# Patient Record
Sex: FEMALE | Race: WHITE | NOT HISPANIC OR LATINO | Employment: FULL TIME | URBAN - METROPOLITAN AREA
[De-identification: names, ages, dates, MRNs, and addresses within clinical notes are randomized per-mention and may not be internally consistent; named-entity substitution may affect disease eponyms.]

---

## 2019-10-14 ENCOUNTER — LAB REQUISITION (OUTPATIENT)
Dept: LAB | Facility: OTHER | Age: 24
End: 2019-10-14

## 2019-10-14 DIAGNOSIS — Z11.1 SCREENING-PULMONARY TB: ICD-10-CM

## 2019-10-14 PROCEDURE — 86481 TB AG RESPONSE T-CELL SUSP: CPT | Performed by: PHYSICAL MEDICINE & REHABILITATION

## 2019-10-16 LAB
TSPOT INTERPRETATION: NEGATIVE
TSPOT NIL CONTROL INTERPRETATION: NORMAL
TSPOT PANEL A: 1
TSPOT PANEL B: 0
TSPOT POS CONTROL INTERPRETATION: NORMAL

## 2020-10-19 ENCOUNTER — LAB REQUISITION (OUTPATIENT)
Dept: LAB | Facility: OTHER | Age: 25
End: 2020-10-19

## 2020-10-19 DIAGNOSIS — Z00.00 ANNUAL PHYSICAL EXAM: ICD-10-CM

## 2020-10-19 PROCEDURE — 86481 TB AG RESPONSE T-CELL SUSP: CPT | Performed by: PHYSICAL MEDICINE & REHABILITATION

## 2020-10-21 LAB
TSPOT INTERPRETATION: NEGATIVE
TSPOT NIL CONTROL INTERPRETATION: NORMAL
TSPOT PANEL A: 0
TSPOT PANEL B: 0
TSPOT POS CONTROL INTERPRETATION: NORMAL

## 2022-09-13 ENCOUNTER — OFFICE VISIT (OUTPATIENT)
Dept: OBSTETRICS AND GYNECOLOGY | Facility: CLINIC | Age: 27
End: 2022-09-13

## 2022-09-13 VITALS
HEART RATE: 95 BPM | WEIGHT: 161.6 LBS | HEIGHT: 64 IN | DIASTOLIC BLOOD PRESSURE: 81 MMHG | BODY MASS INDEX: 27.59 KG/M2 | SYSTOLIC BLOOD PRESSURE: 119 MMHG

## 2022-09-13 DIAGNOSIS — R10.32 LEFT LOWER QUADRANT PAIN: ICD-10-CM

## 2022-09-13 DIAGNOSIS — N93.9 ABNORMAL UTERINE BLEEDING (AUB): Primary | ICD-10-CM

## 2022-09-13 PROCEDURE — 99203 OFFICE O/P NEW LOW 30 MIN: CPT | Performed by: OBSTETRICS & GYNECOLOGY

## 2022-09-13 RX ORDER — MEDROXYPROGESTERONE ACETATE 10 MG/1
10 TABLET ORAL DAILY
Qty: 15 TABLET | Refills: 0 | Status: SHIPPED | OUTPATIENT
Start: 2022-09-13 | End: 2022-09-28

## 2022-09-13 NOTE — PROGRESS NOTES
SUBJECTIVE:   Chief Complaint   Patient presents with   • Vaginal Bleeding     Pt presents today for vaginal bleeding since  and infertility.         Iain Parkinson is a 26 y.o.  who presents for vaginal bleeding and pelvic pain.  Here with her  who she prefers to use as an interpretor.  Reports that she started having pain in  on the right side, had endometriosis surgery in  and that pain improved. She now has left sided, lower quadrant pain. Denies any alleviating or exacerbating factors aside from some improvement with ibuprofen.    Started bleeding irregulary in , and tried oral contraceptive pill on separate courses which helped the bleeding but did not stop it.    In the last 3 months, she had some bleeding that stopped for about 3 months (from May), started again around 22.  She has been bleeding daily since then.  Until last week it was not heavy; in the last week, she has had heavier bleeding with clots and changing her pad 4-5 times per day.  She has been trying to conceive since May 2022.    She conceived within 30 days for her last pregnancy.     Denies h/o transfusion; denies h/o easy bruising or bleeding.  Last CT scan was prior to her last surgery  Last US was in May 2022, showed 3 cm simple cyst of the right ovary.       Past Medical History:   Diagnosis Date   • Endometriosis      Past Surgical History:   Procedure Laterality Date   •  SECTION     • MINI-LAPAROTOMY  2020    subcutaneous endometrioma     OB History    Para Term  AB Living   1 1   1   1   SAB IAB Ectopic Molar Multiple Live Births             1      # Outcome Date GA Lbr Guru/2nd Weight Sex Delivery Anes PTL Lv   1  17 26w2d  754 g (1 lb 10.6 oz) F CS-Classical Gen Y BERNIE      Social History     Tobacco Use   • Smoking status: Never Smoker   • Smokeless tobacco: Never Used   Vaping Use   • Vaping Use: Never used   Substance Use Topics   • Alcohol use: Never  "  • Drug use: Never     Family History   Problem Relation Age of Onset   • Diabetes Father    • Thyroid disease Mother    • Thyroid disease Sister      No current outpatient medications on file prior to visit.     No current facility-administered medications on file prior to visit.     No Known Allergies     Review of Systems      OBJECTIVE:   Vitals:    09/13/22 0842   BP: 119/81   Pulse: 95   Weight: 73.3 kg (161 lb 9.6 oz)   Height: 162.6 cm (64\")      Physical Exam  Constitutional:       General: She is not in acute distress.     Appearance: She is well-developed. She is not diaphoretic.   HENT:      Head: Normocephalic and atraumatic.   Eyes:      General: No scleral icterus.     Extraocular Movements: Extraocular movements intact.   Pulmonary:      Effort: Pulmonary effort is normal. No respiratory distress.   Abdominal:      General: Abdomen is flat. A surgical scar is present.      Palpations: Abdomen is soft.      Tenderness: There is no guarding or rebound. Negative signs include McBurney's sign.      Hernia: No hernia is present.   Skin:     General: Skin is warm and dry.   Neurological:      General: No focal deficit present.      Mental Status: She is alert and oriented to person, place, and time.   Psychiatric:         Mood and Affect: Mood normal.         Behavior: Behavior normal.         Thought Content: Thought content normal.         Judgment: Judgment normal.         ASSESSMENT/PLAN:     ICD-10-CM ICD-9-CM   1. Abnormal uterine bleeding (AUB)  N93.9 626.9   2. Left lower quadrant pain  R10.32 789.04       Will get labs per below. Does have family h/o thyroid disease as well as diabetes. Has been tested for these in the past and were normal  Recommend trial of Provera to get this course of bleeding to stop. Unable to provide specimen for UPT, hcg added to labs  Reviewed differential for pain and work up.  We reviewed use of SIS/HSG to evaluate cavity of uterus and she agrees to SIS. She is aware " of the transvaginal and saline infusion portions of the procedure.  She will ideally do this when she has stopped bleeding.  Reviewed use of CT imaging versus laparoscopy to better evaluate pelvic anatomy, consider referral to a provider with ability to laser in case implants discovered.  She will consider this option.   For LLQ pain, since Ibuprofen has helped in isolated doses, recommend trial of scheduled NSAID x 3 days to see if this improves pain.      Orders Placed This Encounter   Procedures   • CBC (No Diff)     Order Specific Question:   Release to patient     Answer:   Routine Release   • TSH Rfx On Abnormal To Free T4     Order Specific Question:   Release to patient     Answer:   Routine Release   • Prolactin     Order Specific Question:   Release to patient     Answer:   Routine Release   • Von Willebrand Factor Activity     Order Specific Question:   Release to patient     Answer:   Routine Release   • Testosterone   • Follicle Stimulating Hormone     Order Specific Question:   Release to patient     Answer:   Routine Release   • Estradiol   • Hemoglobin A1c     Order Specific Question:   Release to patient     Answer:   Routine Release       Return in about 1 week (around 9/20/2022) for saline infusion sonogram at [place].

## 2022-09-14 LAB
ERYTHROCYTE [DISTWIDTH] IN BLOOD BY AUTOMATED COUNT: 13.5 % (ref 12.3–15.4)
ESTRADIOL SERPL-MCNC: 56.5 PG/ML
FSH SERPL-ACNC: 7 MIU/ML
HBA1C MFR BLD: 5.4 % (ref 4.8–5.6)
HCT VFR BLD AUTO: 41.8 % (ref 34–46.6)
HGB BLD-MCNC: 13.7 G/DL (ref 12–15.9)
MCH RBC QN AUTO: 28.1 PG (ref 26.6–33)
MCHC RBC AUTO-ENTMCNC: 32.8 G/DL (ref 31.5–35.7)
MCV RBC AUTO: 85.8 FL (ref 79–97)
PLATELET # BLD AUTO: 342 10*3/MM3 (ref 140–450)
PROLACTIN SERPL-MCNC: 10.9 NG/ML (ref 4.8–23.3)
RBC # BLD AUTO: 4.87 10*6/MM3 (ref 3.77–5.28)
TESTOST SERPL-MCNC: 73 NG/DL (ref 13–71)
TSH SERPL DL<=0.005 MIU/L-ACNC: 2.63 UIU/ML (ref 0.27–4.2)
WBC # BLD AUTO: 8.78 10*3/MM3 (ref 3.4–10.8)

## 2022-09-15 ENCOUNTER — PATIENT ROUNDING (BHMG ONLY) (OUTPATIENT)
Dept: OBSTETRICS AND GYNECOLOGY | Facility: CLINIC | Age: 27
End: 2022-09-15

## 2022-09-15 ENCOUNTER — PATIENT MESSAGE (OUTPATIENT)
Dept: OBSTETRICS AND GYNECOLOGY | Facility: CLINIC | Age: 27
End: 2022-09-15

## 2022-09-15 LAB — VWF:RCO ACT/NOR PPP PL AGG: 79 % (ref 50–200)

## 2022-09-15 NOTE — PROGRESS NOTES
My chart message has been sent to the patient for PATIENT ROUNDING with [facility].

## 2022-09-26 ENCOUNTER — OFFICE VISIT (OUTPATIENT)
Dept: OBSTETRICS AND GYNECOLOGY | Facility: CLINIC | Age: 27
End: 2022-09-26

## 2022-09-26 VITALS
HEIGHT: 64 IN | HEART RATE: 93 BPM | WEIGHT: 162.6 LBS | BODY MASS INDEX: 27.76 KG/M2 | DIASTOLIC BLOOD PRESSURE: 75 MMHG | SYSTOLIC BLOOD PRESSURE: 114 MMHG

## 2022-09-26 DIAGNOSIS — N93.9 ABNORMAL UTERINE BLEEDING (AUB): Primary | ICD-10-CM

## 2022-09-26 DIAGNOSIS — R79.89 ELEVATED TESTOSTERONE LEVEL: ICD-10-CM

## 2022-09-26 LAB
B-HCG UR QL: NEGATIVE
EXPIRATION DATE: NORMAL
INTERNAL NEGATIVE CONTROL: NEGATIVE
INTERNAL POSITIVE CONTROL: POSITIVE
Lab: NORMAL

## 2022-09-26 PROCEDURE — 81025 URINE PREGNANCY TEST: CPT | Performed by: OBSTETRICS & GYNECOLOGY

## 2022-09-26 PROCEDURE — 99213 OFFICE O/P EST LOW 20 MIN: CPT | Performed by: OBSTETRICS & GYNECOLOGY

## 2022-09-26 PROCEDURE — 58340 CATHETER FOR HYSTEROGRAPHY: CPT | Performed by: OBSTETRICS & GYNECOLOGY

## 2022-09-26 RX ORDER — SODIUM CHLORIDE 0.9 % (FLUSH) 0.9 %
3 SYRINGE (ML) INJECTION EVERY 12 HOURS SCHEDULED
Status: CANCELLED | OUTPATIENT
Start: 2022-11-30

## 2022-09-26 RX ORDER — SODIUM CHLORIDE 0.9 % (FLUSH) 0.9 %
10 SYRINGE (ML) INJECTION AS NEEDED
Status: CANCELLED | OUTPATIENT
Start: 2022-11-30

## 2022-09-26 NOTE — PROGRESS NOTES
"SUBJECTIVE:   Chief Complaint   Patient presents with   • Follow-up     Pt presents today for SIS        Iain Parkinson is a 26 y.o.  who presents for AUB. Still having some bleeding, last period mid August    Past Medical History:   Diagnosis Date   • Endometriosis      Past Surgical History:   Procedure Laterality Date   •  SECTION     • MINI-LAPAROTOMY  2020    subcutaneous endometrioma     OB History    Para Term  AB Living   1 1   1   1   SAB IAB Ectopic Molar Multiple Live Births             1      # Outcome Date GA Lbr Guru/2nd Weight Sex Delivery Anes PTL Lv   1  17 26w2d  754 g (1 lb 10.6 oz) F CS-Classical Gen Y BERNIE      Social History     Tobacco Use   • Smoking status: Never Smoker   • Smokeless tobacco: Never Used   Vaping Use   • Vaping Use: Never used   Substance Use Topics   • Alcohol use: Never   • Drug use: Never     Family History   Problem Relation Age of Onset   • Diabetes Father    • Thyroid disease Mother    • Thyroid disease Sister      Current Outpatient Medications on File Prior to Visit   Medication Sig Dispense Refill   • medroxyPROGESTERone (Provera) 10 MG tablet Take 1 tablet by mouth Daily for 15 days. 15 tablet 0     No current facility-administered medications on file prior to visit.     No Known Allergies     Review of Systems      OBJECTIVE:   Vitals:    22 1303   BP: 114/75   Pulse: 93   Weight: 73.8 kg (162 lb 9.6 oz)   Height: 162.6 cm (64.02\")      Physical Exam  Exam conducted with a chaperone present.   Constitutional:       Appearance: She is well-developed.   HENT:      Head: Normocephalic and atraumatic.   Eyes:      General: No scleral icterus.  Cardiovascular:      Rate and Rhythm: Normal rate.   Pulmonary:      Effort: Pulmonary effort is normal. No respiratory distress.   Abdominal:      General: There is no distension.      Palpations: Abdomen is soft.      Tenderness: There is no abdominal tenderness. There is no " guarding.   Genitourinary:     Labia:         Right: No rash, tenderness or lesion.         Left: No rash, tenderness or lesion.       Vagina: No vaginal discharge, bleeding or lesions.      Cervix: No cervical motion tenderness or friability.      Uterus: Normal. Not enlarged and not tender.       Adnexa: Right adnexa normal.        Right: No mass or tenderness.          Left: No mass or tenderness.     Musculoskeletal:      Cervical back: Normal range of motion.   Skin:     General: Skin is warm and dry.   Neurological:      Mental Status: She is alert and oriented to person, place, and time.   Psychiatric:         Behavior: Behavior normal.         ASSESSMENT/PLAN:     ICD-10-CM ICD-9-CM   1. Abnormal uterine bleeding (AUB)  N93.9 626.9   2. Elevated testosterone level  R79.89 790.6       Options for treatment were discussed with patient including hysteroscopy, D&C, polypectomy.    After discussing risks and benefits of each alternative, patient would like to proceed with HYSTEROSCOPY, DILATION AND CURETTAGE, HYSTEROSCOPIC POLYPECTOMY  She is understanding that this procedure involves dilation of cervix and placement of camera through cervix into uterus for removal of polyp and the risks include but are not limited to risks of anesthesia, injury to surrounding structures, infection, bleeding, uterine perforation, inability to diagnose, need for other surgeries/procedures.    She was verbally consented for the procedure and had all questions answered to her apparent satisfaction upon completion of the discussion.      PCO- counseled pt on elevated testosterone, will repeat today. May need to consider OI after surgery.         Orders Placed This Encounter   Procedures   • Testosterone, Free, Total     Order Specific Question:   Release to patient     Answer:   Routine Release   • DHEA-Sulfate     Order Specific Question:   Release to patient     Answer:   Routine Release   • POC Pregnancy, Urine     Order Specific  Question:   Release to patient     Answer:   Routine Release       No follow-ups on file.

## 2022-09-26 NOTE — PROGRESS NOTES
Saline Infusion Sonogram    Pre-operative Diagnosis: AUB    Post-operative Diagnosis: Same    Counseling/indications:  Iain Parkinson is a 26 y.o.  who presented with AUB. Reports that she had a period in August; still spotting; still taking provera. She was counseled on options for management/work up and agreed to a saline infusion sonogram. She understood that the risks of this procedure include but are not limited to bleeding, pain, infection, injury to surrounding structures.  We reviewed timing of the procedure for the most accurate image and an appointment was scheduled for her.  Her  is here with her today who she prefers to use as an interpretor    Procedure Details    Urine pregnancy test was done and was NEGATIVE .  The risks (including infection, bleeding, pain, and uterine perforation) and benefits of the procedure were explained to the patient and Verbal informed consent was obtained.       A timeout procedure was performed.  The patient was placed in the dorsal lithotomy position.  Bimanual exam showed the uterus to be in the anteroflexed position.  An open-sided Graves' speculum inserted in the vagina, and the cervix prepped with povidone iodine.       A sharp tenaculum was applied to the anterior lip of the cervix for stabilization.   A pipelle was placed into the cervix and the intrauterine balloon was inflated.  The tenaculum and the speculum were then removed and the transvaginal probe was placed by the sonographer.  After survey of the uterus, the intrauterine balloon was deflated and saline was injected into the uterus with the noted findings on pathology.  The pipelle was then removed.  A general ultrasound survey was performed of the uterus and adnexa with findings as mentioned in ultrasound report.    Condition:  Stable    Complications:  None    Findings:  Bilateral PCO-appearing ovaries, fundal polyp noted with otherwise normal appearing cavity/endometrium, compared to US from  May 2022 cyst has resolved    Plan:  Patient tolerated the procedure well  The patient was advised to call for any fever or for prolonged or severe pain or bleeding. She was advised to use OTC analgesics as needed for mild to moderate pain. She was advised to avoid vaginal intercourse for 48 hours or until the bleeding has completely stopped.      Serina Moreno MD

## 2022-09-27 LAB — DHEA-S SERPL-MCNC: 251 UG/DL (ref 84.8–378)

## 2022-09-29 LAB
TESTOST FREE SERPL-MCNC: 2.4 PG/ML (ref 0–4.2)
TESTOST SERPL-MCNC: 57 NG/DL (ref 13–71)

## 2022-10-04 PROBLEM — N93.9 ABNORMAL UTERINE BLEEDING (AUB): Status: ACTIVE | Noted: 2022-10-04

## 2022-10-24 ENCOUNTER — TELEPHONE (OUTPATIENT)
Dept: OBSTETRICS AND GYNECOLOGY | Facility: CLINIC | Age: 27
End: 2022-10-24

## 2022-11-02 ENCOUNTER — PATIENT MESSAGE (OUTPATIENT)
Dept: OBSTETRICS AND GYNECOLOGY | Facility: CLINIC | Age: 27
End: 2022-11-02

## 2022-11-02 RX ORDER — MEDROXYPROGESTERONE ACETATE 10 MG/1
10 TABLET ORAL DAILY
Qty: 30 TABLET | Refills: 0 | Status: SHIPPED | OUTPATIENT
Start: 2022-11-02 | End: 2022-11-30 | Stop reason: HOSPADM

## 2022-11-28 ENCOUNTER — PRE-ADMISSION TESTING (OUTPATIENT)
Dept: PREADMISSION TESTING | Facility: HOSPITAL | Age: 27
End: 2022-11-28

## 2022-11-28 VITALS
RESPIRATION RATE: 16 BRPM | WEIGHT: 163.1 LBS | HEIGHT: 65 IN | HEART RATE: 98 BPM | DIASTOLIC BLOOD PRESSURE: 83 MMHG | SYSTOLIC BLOOD PRESSURE: 127 MMHG | BODY MASS INDEX: 27.17 KG/M2 | TEMPERATURE: 98.3 F | OXYGEN SATURATION: 99 %

## 2022-11-28 LAB — HCG SERPL QL: NEGATIVE

## 2022-11-28 PROCEDURE — 85025 COMPLETE CBC W/AUTO DIFF WBC: CPT | Performed by: OBSTETRICS & GYNECOLOGY

## 2022-11-28 PROCEDURE — 84703 CHORIONIC GONADOTROPIN ASSAY: CPT

## 2022-11-30 ENCOUNTER — HOSPITAL ENCOUNTER (OUTPATIENT)
Facility: HOSPITAL | Age: 27
Setting detail: HOSPITAL OUTPATIENT SURGERY
Discharge: HOME | End: 2022-11-30
Attending: OBSTETRICS & GYNECOLOGY | Admitting: OBSTETRICS & GYNECOLOGY

## 2022-11-30 ENCOUNTER — ANESTHESIA EVENT (OUTPATIENT)
Dept: PERIOP | Facility: HOSPITAL | Age: 27
End: 2022-11-30

## 2022-11-30 ENCOUNTER — ANESTHESIA (OUTPATIENT)
Dept: PERIOP | Facility: HOSPITAL | Age: 27
End: 2022-11-30

## 2022-11-30 VITALS
SYSTOLIC BLOOD PRESSURE: 107 MMHG | RESPIRATION RATE: 16 BRPM | TEMPERATURE: 98.6 F | OXYGEN SATURATION: 97 % | DIASTOLIC BLOOD PRESSURE: 59 MMHG | BODY MASS INDEX: 28 KG/M2 | HEIGHT: 64 IN | HEART RATE: 71 BPM

## 2022-11-30 DIAGNOSIS — N93.9 ABNORMAL UTERINE BLEEDING (AUB): ICD-10-CM

## 2022-11-30 PROBLEM — N84.0 ENDOMETRIAL POLYP: Status: ACTIVE | Noted: 2022-11-30

## 2022-11-30 PROCEDURE — 25010000002 HYDROMORPHONE PER 4 MG: Performed by: NURSE ANESTHETIST, CERTIFIED REGISTERED

## 2022-11-30 PROCEDURE — 25010000002 FENTANYL CITRATE (PF) 50 MCG/ML SOLUTION: Performed by: NURSE ANESTHETIST, CERTIFIED REGISTERED

## 2022-11-30 PROCEDURE — 25010000002 PROPOFOL 10 MG/ML EMULSION: Performed by: NURSE ANESTHETIST, CERTIFIED REGISTERED

## 2022-11-30 PROCEDURE — 58558 HYSTEROSCOPY BIOPSY: CPT | Performed by: OBSTETRICS & GYNECOLOGY

## 2022-11-30 PROCEDURE — C1782 MORCELLATOR: HCPCS | Performed by: OBSTETRICS & GYNECOLOGY

## 2022-11-30 PROCEDURE — 25010000002 DROPERIDOL PER 5 MG: Performed by: ANESTHESIOLOGY

## 2022-11-30 PROCEDURE — 25010000002 KETOROLAC TROMETHAMINE PER 15 MG: Performed by: NURSE ANESTHETIST, CERTIFIED REGISTERED

## 2022-11-30 PROCEDURE — 25010000002 DEXAMETHASONE SODIUM PHOSPHATE 20 MG/5ML SOLUTION: Performed by: NURSE ANESTHETIST, CERTIFIED REGISTERED

## 2022-11-30 PROCEDURE — 88305 TISSUE EXAM BY PATHOLOGIST: CPT | Performed by: OBSTETRICS & GYNECOLOGY

## 2022-11-30 PROCEDURE — 25010000002 ONDANSETRON PER 1 MG: Performed by: NURSE ANESTHETIST, CERTIFIED REGISTERED

## 2022-11-30 RX ORDER — HYDROMORPHONE HYDROCHLORIDE 1 MG/ML
0.5 INJECTION, SOLUTION INTRAMUSCULAR; INTRAVENOUS; SUBCUTANEOUS
Status: DISCONTINUED | OUTPATIENT
Start: 2022-11-30 | End: 2022-11-30 | Stop reason: HOSPADM

## 2022-11-30 RX ORDER — HYDROCODONE BITARTRATE AND ACETAMINOPHEN 7.5; 325 MG/1; MG/1
1 TABLET ORAL ONCE AS NEEDED
Status: DISCONTINUED | OUTPATIENT
Start: 2022-11-30 | End: 2022-11-30 | Stop reason: HOSPADM

## 2022-11-30 RX ORDER — LIDOCAINE HYDROCHLORIDE 20 MG/ML
INJECTION, SOLUTION EPIDURAL; INFILTRATION; INTRACAUDAL; PERINEURAL AS NEEDED
Status: DISCONTINUED | OUTPATIENT
Start: 2022-11-30 | End: 2022-11-30 | Stop reason: SURG

## 2022-11-30 RX ORDER — EPHEDRINE SULFATE 50 MG/ML
5 INJECTION, SOLUTION INTRAVENOUS ONCE AS NEEDED
Status: DISCONTINUED | OUTPATIENT
Start: 2022-11-30 | End: 2022-11-30 | Stop reason: HOSPADM

## 2022-11-30 RX ORDER — PROPOFOL 10 MG/ML
VIAL (ML) INTRAVENOUS AS NEEDED
Status: DISCONTINUED | OUTPATIENT
Start: 2022-11-30 | End: 2022-11-30 | Stop reason: SURG

## 2022-11-30 RX ORDER — ONDANSETRON 4 MG/1
4 TABLET, FILM COATED ORAL DAILY PRN
Qty: 30 TABLET | Refills: 1 | Status: SHIPPED | OUTPATIENT
Start: 2022-11-30 | End: 2022-11-30 | Stop reason: SDUPTHER

## 2022-11-30 RX ORDER — DEXAMETHASONE SODIUM PHOSPHATE 4 MG/ML
INJECTION, SOLUTION INTRA-ARTICULAR; INTRALESIONAL; INTRAMUSCULAR; INTRAVENOUS; SOFT TISSUE AS NEEDED
Status: DISCONTINUED | OUTPATIENT
Start: 2022-11-30 | End: 2022-11-30 | Stop reason: SURG

## 2022-11-30 RX ORDER — DIPHENHYDRAMINE HYDROCHLORIDE 50 MG/ML
12.5 INJECTION INTRAMUSCULAR; INTRAVENOUS
Status: DISCONTINUED | OUTPATIENT
Start: 2022-11-30 | End: 2022-11-30 | Stop reason: HOSPADM

## 2022-11-30 RX ORDER — FENTANYL CITRATE 50 UG/ML
INJECTION, SOLUTION INTRAMUSCULAR; INTRAVENOUS AS NEEDED
Status: DISCONTINUED | OUTPATIENT
Start: 2022-11-30 | End: 2022-11-30 | Stop reason: SURG

## 2022-11-30 RX ORDER — KETOROLAC TROMETHAMINE 30 MG/ML
INJECTION, SOLUTION INTRAMUSCULAR; INTRAVENOUS AS NEEDED
Status: DISCONTINUED | OUTPATIENT
Start: 2022-11-30 | End: 2022-11-30 | Stop reason: SURG

## 2022-11-30 RX ORDER — ONDANSETRON 2 MG/ML
4 INJECTION INTRAMUSCULAR; INTRAVENOUS ONCE AS NEEDED
Status: COMPLETED | OUTPATIENT
Start: 2022-11-30 | End: 2022-11-30

## 2022-11-30 RX ORDER — LABETALOL HYDROCHLORIDE 5 MG/ML
5 INJECTION, SOLUTION INTRAVENOUS
Status: DISCONTINUED | OUTPATIENT
Start: 2022-11-30 | End: 2022-11-30 | Stop reason: HOSPADM

## 2022-11-30 RX ORDER — OXYCODONE AND ACETAMINOPHEN 7.5; 325 MG/1; MG/1
1 TABLET ORAL EVERY 4 HOURS PRN
Status: DISCONTINUED | OUTPATIENT
Start: 2022-11-30 | End: 2022-11-30 | Stop reason: HOSPADM

## 2022-11-30 RX ORDER — SODIUM CHLORIDE 0.9 % (FLUSH) 0.9 %
10 SYRINGE (ML) INJECTION AS NEEDED
Status: DISCONTINUED | OUTPATIENT
Start: 2022-11-30 | End: 2022-11-30 | Stop reason: HOSPADM

## 2022-11-30 RX ORDER — HYDRALAZINE HYDROCHLORIDE 20 MG/ML
5 INJECTION INTRAMUSCULAR; INTRAVENOUS
Status: DISCONTINUED | OUTPATIENT
Start: 2022-11-30 | End: 2022-11-30 | Stop reason: HOSPADM

## 2022-11-30 RX ORDER — IBUPROFEN 600 MG/1
600 TABLET ORAL EVERY 6 HOURS PRN
Qty: 30 TABLET | Refills: 0 | Status: SHIPPED | OUTPATIENT
Start: 2022-11-30 | End: 2022-12-22

## 2022-11-30 RX ORDER — DROPERIDOL 2.5 MG/ML
0.62 INJECTION, SOLUTION INTRAMUSCULAR; INTRAVENOUS ONCE
Status: COMPLETED | OUTPATIENT
Start: 2022-11-30 | End: 2022-11-30

## 2022-11-30 RX ORDER — ONDANSETRON 4 MG/1
4 TABLET, FILM COATED ORAL DAILY PRN
Qty: 30 TABLET | Refills: 1 | Status: SHIPPED | OUTPATIENT
Start: 2022-11-30 | End: 2022-12-22

## 2022-11-30 RX ORDER — FLUMAZENIL 0.1 MG/ML
0.2 INJECTION INTRAVENOUS AS NEEDED
Status: DISCONTINUED | OUTPATIENT
Start: 2022-11-30 | End: 2022-11-30 | Stop reason: HOSPADM

## 2022-11-30 RX ORDER — NALOXONE HCL 0.4 MG/ML
0.2 VIAL (ML) INJECTION AS NEEDED
Status: DISCONTINUED | OUTPATIENT
Start: 2022-11-30 | End: 2022-11-30 | Stop reason: HOSPADM

## 2022-11-30 RX ORDER — FAMOTIDINE 10 MG/ML
20 INJECTION, SOLUTION INTRAVENOUS
Status: COMPLETED | OUTPATIENT
Start: 2022-11-30 | End: 2022-11-30

## 2022-11-30 RX ORDER — PROMETHAZINE HYDROCHLORIDE 25 MG/1
25 TABLET ORAL ONCE AS NEEDED
Status: DISCONTINUED | OUTPATIENT
Start: 2022-11-30 | End: 2022-11-30 | Stop reason: HOSPADM

## 2022-11-30 RX ORDER — DIPHENHYDRAMINE HCL 25 MG
25 CAPSULE ORAL
Status: DISCONTINUED | OUTPATIENT
Start: 2022-11-30 | End: 2022-11-30 | Stop reason: HOSPADM

## 2022-11-30 RX ORDER — SODIUM CHLORIDE 0.9 % (FLUSH) 0.9 %
10 SYRINGE (ML) INJECTION EVERY 12 HOURS SCHEDULED
Status: DISCONTINUED | OUTPATIENT
Start: 2022-11-30 | End: 2022-11-30 | Stop reason: HOSPADM

## 2022-11-30 RX ORDER — ONDANSETRON 2 MG/ML
INJECTION INTRAMUSCULAR; INTRAVENOUS AS NEEDED
Status: DISCONTINUED | OUTPATIENT
Start: 2022-11-30 | End: 2022-11-30 | Stop reason: SURG

## 2022-11-30 RX ORDER — SODIUM CHLORIDE 0.9 % (FLUSH) 0.9 %
3 SYRINGE (ML) INJECTION EVERY 12 HOURS SCHEDULED
Status: DISCONTINUED | OUTPATIENT
Start: 2022-11-30 | End: 2022-11-30 | Stop reason: HOSPADM

## 2022-11-30 RX ORDER — SODIUM CHLORIDE 9 MG/ML
40 INJECTION, SOLUTION INTRAVENOUS AS NEEDED
Status: DISCONTINUED | OUTPATIENT
Start: 2022-11-30 | End: 2022-11-30 | Stop reason: HOSPADM

## 2022-11-30 RX ORDER — SODIUM CHLORIDE, SODIUM LACTATE, POTASSIUM CHLORIDE, CALCIUM CHLORIDE 600; 310; 30; 20 MG/100ML; MG/100ML; MG/100ML; MG/100ML
9 INJECTION, SOLUTION INTRAVENOUS CONTINUOUS PRN
Status: DISCONTINUED | OUTPATIENT
Start: 2022-11-30 | End: 2022-11-30 | Stop reason: HOSPADM

## 2022-11-30 RX ORDER — PROMETHAZINE HYDROCHLORIDE 25 MG/1
25 SUPPOSITORY RECTAL ONCE AS NEEDED
Status: DISCONTINUED | OUTPATIENT
Start: 2022-11-30 | End: 2022-11-30 | Stop reason: HOSPADM

## 2022-11-30 RX ORDER — MIDAZOLAM HYDROCHLORIDE 1 MG/ML
1 INJECTION INTRAMUSCULAR; INTRAVENOUS
Status: DISCONTINUED | OUTPATIENT
Start: 2022-11-30 | End: 2022-11-30 | Stop reason: HOSPADM

## 2022-11-30 RX ORDER — FENTANYL CITRATE 50 UG/ML
50 INJECTION, SOLUTION INTRAMUSCULAR; INTRAVENOUS
Status: DISCONTINUED | OUTPATIENT
Start: 2022-11-30 | End: 2022-11-30 | Stop reason: HOSPADM

## 2022-11-30 RX ADMIN — FENTANYL CITRATE 50 MCG: 0.05 INJECTION, SOLUTION INTRAMUSCULAR; INTRAVENOUS at 09:16

## 2022-11-30 RX ADMIN — ONDANSETRON 4 MG: 2 INJECTION INTRAMUSCULAR; INTRAVENOUS at 11:40

## 2022-11-30 RX ADMIN — SODIUM CHLORIDE, POTASSIUM CHLORIDE, SODIUM LACTATE AND CALCIUM CHLORIDE 9 ML/HR: 600; 310; 30; 20 INJECTION, SOLUTION INTRAVENOUS at 08:31

## 2022-11-30 RX ADMIN — LIDOCAINE HYDROCHLORIDE 80 MG: 20 INJECTION, SOLUTION EPIDURAL; INFILTRATION; INTRACAUDAL; PERINEURAL at 09:08

## 2022-11-30 RX ADMIN — ONDANSETRON 4 MG: 2 INJECTION INTRAMUSCULAR; INTRAVENOUS at 09:33

## 2022-11-30 RX ADMIN — FENTANYL CITRATE 50 MCG: 0.05 INJECTION, SOLUTION INTRAMUSCULAR; INTRAVENOUS at 09:54

## 2022-11-30 RX ADMIN — FENTANYL CITRATE 50 MCG: 0.05 INJECTION, SOLUTION INTRAMUSCULAR; INTRAVENOUS at 10:16

## 2022-11-30 RX ADMIN — HYDROMORPHONE HYDROCHLORIDE 0.5 MG: 1 INJECTION, SOLUTION INTRAMUSCULAR; INTRAVENOUS; SUBCUTANEOUS at 10:06

## 2022-11-30 RX ADMIN — FAMOTIDINE 20 MG: 10 INJECTION INTRAVENOUS at 08:31

## 2022-11-30 RX ADMIN — KETOROLAC TROMETHAMINE 30 MG: 30 INJECTION, SOLUTION INTRAMUSCULAR at 09:31

## 2022-11-30 RX ADMIN — DEXAMETHASONE SODIUM PHOSPHATE 10 MG: 4 INJECTION, SOLUTION INTRAMUSCULAR; INTRAVENOUS at 09:14

## 2022-11-30 RX ADMIN — DROPERIDOL 0.62 MG: 2.5 INJECTION, SOLUTION INTRAMUSCULAR; INTRAVENOUS at 12:57

## 2022-11-30 RX ADMIN — PROPOFOL 200 MG: 10 INJECTION, EMULSION INTRAVENOUS at 09:08

## 2022-11-30 NOTE — ANESTHESIA PROCEDURE NOTES
Airway  Urgency: elective    Date/Time: 11/30/2022 9:11 AM    General Information and Staff    Patient location during procedure: OR  CRNA/CAA: Aisha Ashby CRNA    Indications and Patient Condition  Mask difficulty assessment: 1 - vent by mask    Final Airway Details  Final airway type: supraglottic airway      Successful airway: LMA  Size 4     Number of attempts at approach: 1  Assessment: lips, teeth, and gum same as pre-op and atraumatic intubation    Additional Comments  Teeth, tongue, lips, and gums in preop condition. VSS throughout. Easy mask/smooth easy insertion.

## 2022-11-30 NOTE — ANESTHESIA POSTPROCEDURE EVALUATION
"Patient: Iain Parkinson    Procedure Summary     Date: 11/30/22 Room / Location: [facility] OR  / [facility] MAIN OR    Anesthesia Start: 0902 Anesthesia Stop: 0949    Procedure: DILATATION AND CURETTAGE HYSTEROSCOPY WITH MYOSURE,POLYPECTOMY (Uterus) Diagnosis:       Abnormal uterine bleeding (AUB)      (Abnormal uterine bleeding (AUB) [N93.9])    Surgeons: Serina Moreno MD Provider: Emmanuel Hackett MD    Anesthesia Type: general ASA Status: 2          Anesthesia Type: general    Vitals  Vitals Value Taken Time   /75 11/30/22 1046   Temp 37 °C (98.6 °F) 11/30/22 0946   Pulse 82 11/30/22 1054   Resp 16 11/30/22 1045   SpO2 99 % 11/30/22 1054   Vitals shown include unvalidated device data.        Post Anesthesia Care and Evaluation    Patient location during evaluation: PHASE II  Patient participation: complete - patient participated  Level of consciousness: awake and alert  Pain management: adequate    Airway patency: patent  Anesthetic complications: No anesthetic complications    Cardiovascular status: acceptable  Respiratory status: acceptable  Hydration status: acceptable    Comments: BP 96/65   Pulse 85   Temp 37 °C (98.6 °F) (Oral)   Resp 16   Ht 162.6 cm (64\")   LMP 11/16/2022 (Approximate) Comment: neg hcg 11/28/22  SpO2 97%   BMI 28.00 kg/m²         "

## 2022-11-30 NOTE — ANESTHESIA PREPROCEDURE EVALUATION
Anesthesia Evaluation     Patient summary reviewed   NPO Solid Status: > 8 hours             Airway   Mallampati: II  Neck ROM: full  No difficulty expected  Dental      Pulmonary    Cardiovascular     Rhythm: regular        Neuro/Psych  GI/Hepatic/Renal/Endo      Musculoskeletal     Abdominal    Substance History      OB/GYN          Other          Other Comment: endometriosis                  Anesthesia Plan    ASA 2     general     (MAC discussed. Pt prefers GA)    Anesthetic plan, risks, benefits, and alternatives have been provided, discussed and informed consent has been obtained with: patient.    Use of blood products discussed with patient .       CODE STATUS:

## 2022-12-01 LAB
LAB AP CASE REPORT: NORMAL
PATH REPORT.FINAL DX SPEC: NORMAL
PATH REPORT.GROSS SPEC: NORMAL

## 2022-12-02 ENCOUNTER — TELEPHONE (OUTPATIENT)
Dept: OBSTETRICS AND GYNECOLOGY | Facility: CLINIC | Age: 27
End: 2022-12-02

## 2022-12-02 NOTE — TELEPHONE ENCOUNTER
Called patient to review pathology from surgery; she is aware.  Bleeding is light.  She has an appointment on 12/22 and we will talk about OI at that time.

## 2022-12-22 ENCOUNTER — OFFICE VISIT (OUTPATIENT)
Dept: OBSTETRICS AND GYNECOLOGY | Facility: CLINIC | Age: 27
End: 2022-12-22

## 2022-12-22 VITALS
HEIGHT: 64 IN | DIASTOLIC BLOOD PRESSURE: 81 MMHG | BODY MASS INDEX: 27.55 KG/M2 | HEART RATE: 99 BPM | SYSTOLIC BLOOD PRESSURE: 122 MMHG | WEIGHT: 161.4 LBS

## 2022-12-22 DIAGNOSIS — N93.9 ABNORMAL UTERINE BLEEDING (AUB): ICD-10-CM

## 2022-12-22 DIAGNOSIS — Z98.890 POST-OPERATIVE STATE: Primary | ICD-10-CM

## 2022-12-22 DIAGNOSIS — Z12.4 SCREENING FOR CERVICAL CANCER: ICD-10-CM

## 2022-12-22 PROCEDURE — 81025 URINE PREGNANCY TEST: CPT | Performed by: OBSTETRICS & GYNECOLOGY

## 2022-12-22 PROCEDURE — 99213 OFFICE O/P EST LOW 20 MIN: CPT | Performed by: OBSTETRICS & GYNECOLOGY

## 2022-12-22 NOTE — PROGRESS NOTES
"Chief Complaint   Patient presents with   • Post-op     Pt presents today for 4 week post op       Iain Parkinson is a 27 y.o. female who presents to the clinic status post dilation and curettage, hysteroscopic polypectomy on 22 for endometrial polyp.     Eating a regular diet without difficulty. She stopped bleeding . Has not had menses since  Plan is for ovulation induction  She has an OB hx significant for h/o  delivery at 26 weeks.  She is aware of the increased risk for  delivery with subsequent pregnancies.  She and her  have not been sexually active since surgery, but report prior to surgery had postcoital spotting from time to time. Last pap I can find on file is 2017 per note from [facility].     Past Medical History:   Diagnosis Date   • Endometriosis    • Seasonal allergies    • Uterine polyp      Past Surgical History:   Procedure Laterality Date   •  SECTION     • D & C HYSTEROSCOPY N/A 2022    Procedure: DILATATION AND CURETTAGE HYSTEROSCOPY WITH MYOSURE,POLYPECTOMY;  Surgeon: Serina Moreno MD;  Location: [facility];  Service: Obstetrics/Gynecology;  Laterality: N/A;   • MINI-LAPAROTOMY  2020    subcutaneous endometrioma     Social History     Tobacco Use   • Smoking status: Never   • Smokeless tobacco: Never   Vaping Use   • Vaping Use: Never used   Substance Use Topics   • Alcohol use: Never   • Drug use: Never     Family History   Problem Relation Age of Onset   • Thyroid disease Mother    • Diabetes Father    • Thyroid disease Sister    • Malig Hyperthermia Neg Hx          Review of Systems    OBJECTIVE:   Vitals:    22 1328   BP: 122/81   Pulse: 99   Weight: 73.2 kg (161 lb 6.4 oz)   Height: 162.6 cm (64.02\")        Physical Exam  Exam conducted with a chaperone present.   Constitutional:       General: She is not in acute distress.     Appearance: She is well-developed. She is not diaphoretic.   HENT:      Head: Normocephalic and " atraumatic.   Eyes:      General: No scleral icterus.     Extraocular Movements: Extraocular movements intact.   Pulmonary:      Effort: Pulmonary effort is normal. No respiratory distress.   Abdominal:      General: Abdomen is flat.      Palpations: Abdomen is soft.   Genitourinary:     General: Normal vulva.      Cervix: Normal.      Uterus: Normal.       Adnexa: Right adnexa normal and left adnexa normal.   Skin:     General: Skin is warm and dry.   Neurological:      General: No focal deficit present.      Mental Status: She is alert and oriented to person, place, and time.   Psychiatric:         Mood and Affect: Mood normal.         Behavior: Behavior normal.         Thought Content: Thought content normal.         Judgment: Judgment normal.           1. Endometrium, Polyp:                A. Benign endometrial polyp.    ASSESSMENT:   Diagnosis Plan   1. Post-operative state  POC Pregnancy, Urine      2. Abnormal uterine bleeding (AUB)  POC Pregnancy, Urine      3. Screening for cervical cancer  IGP, Rfx Aptima HPV ASCU    IGP, Rfx Aptima HPV ASCU            PLAN:   Reviewed postop instructions, activity restrictions   I have reviewed this patient's and counseled her on the options for further workup and treatment of infertility.  We have discussed the options of further testing (including but not limited to semen analysis, hysterosalpingogram, laboratory evaluation), referral to reproductive endocrinology and infertility specialist, proceeding with ovulation induction for 1-2 cycles without further workup.  She was counseled that ovulation induction agents can increase the risk of certain factors such as multiple gestation.  We discussed that there are 2 medications use for ovulation induction: Clomiphene citrate and letrozole.  Letrozole is not FDA approved drug and the use for ovulation induction is off label.  She was counseled that proceeding with ovulation induction without further testing can increase the  risk of certain factors such as a tubal pregnancy given unknown tubal patency.  She was also counseled that taking letrozole while pregnant could cause birth defects  - she elects to do letrozole day 3-7 of next cycle. She will call with her next menses and come in for urine pregnancy test.  She will then start letrozole 2.5mg day 3-7 of cycle with timed intercourse.    She understands the risks and limitations of this decision.      Reviewed pathology from surgery and pictures  Updated pap smear and recommend calling if postcoital spotting continues after polypectomy.  Pain control adequate    RTO for pregnancy test with next cycle

## 2023-01-03 LAB
CONV .: NORMAL
CYTOLOGIST CVX/VAG CYTO: NORMAL
CYTOLOGY CVX/VAG DOC CYTO: NORMAL
CYTOLOGY CVX/VAG DOC THIN PREP: NORMAL
DX ICD CODE: NORMAL
HIV 1 & 2 AB SER-IMP: NORMAL
OTHER STN SPEC: NORMAL
STAT OF ADQ CVX/VAG CYTO-IMP: NORMAL

## 2023-01-18 RX ORDER — MEDROXYPROGESTERONE ACETATE 10 MG/1
10 TABLET ORAL DAILY
Qty: 10 TABLET | Refills: 0 | Status: SHIPPED | OUTPATIENT
Start: 2023-01-18 | End: 2023-01-28

## 2023-01-31 ENCOUNTER — TELEPHONE (OUTPATIENT)
Dept: OBSTETRICS AND GYNECOLOGY | Facility: CLINIC | Age: 28
End: 2023-01-31
Payer: MEDICAID

## 2023-01-31 DIAGNOSIS — N93.9 ABNORMAL UTERINE BLEEDING (AUB): Primary | ICD-10-CM

## 2023-02-01 ENCOUNTER — TELEPHONE (OUTPATIENT)
Dept: OBSTETRICS AND GYNECOLOGY | Facility: CLINIC | Age: 28
End: 2023-02-01
Payer: MEDICAID

## 2023-02-01 LAB — HCG INTACT+B SERPL-ACNC: <1 MIU/ML

## 2023-02-01 RX ORDER — LETROZOLE 2.5 MG/1
TABLET, FILM COATED ORAL
Qty: 10 TABLET | Refills: 0 | Status: SHIPPED | OUTPATIENT
Start: 2023-02-01

## 2023-02-01 RX ORDER — LETROZOLE 2.5 MG/1
TABLET, FILM COATED ORAL
Qty: 10 TABLET | Refills: 0 | Status: SHIPPED | OUTPATIENT
Start: 2023-02-01 | End: 2023-02-01

## 2023-05-22 ENCOUNTER — PATIENT MESSAGE (OUTPATIENT)
Dept: OBSTETRICS AND GYNECOLOGY | Facility: CLINIC | Age: 28
End: 2023-05-22

## 2023-05-23 NOTE — TELEPHONE ENCOUNTER
My Chart. Last seen Post op D&C, AUB 12/22/22. She is trying to get pregnant, but no cycle since 3/3/23. Requesting an Rx to start her period and recommendation of a good multi-vitamin to improve her chance for pregnancy. [facility] pharmacy. Thank you

## 2023-05-25 NOTE — PROGRESS NOTES
SUBJECTIVE:   Chief Complaint   Patient presents with   • Follow-up     Pt states no menstrual cycle for 2 months and has had neg pregnancy tests        Iain Parkinson is a 27 y.o.  who presents for absent menses, last was March. She desires conception.  She has had a normal period in /Feb/March. Here with her     She used letrozole x 1 cycle.  Prior work up includes SIS with hysteroscopy, D&C in 2022.      Past Medical History:   Diagnosis Date   • Endometriosis    • Seasonal allergies    • Uterine polyp      Past Surgical History:   Procedure Laterality Date   •  SECTION     • D & C HYSTEROSCOPY N/A 2022    Procedure: DILATATION AND CURETTAGE HYSTEROSCOPY WITH MYOSURE,POLYPECTOMY;  Surgeon: Serina Moreno MD;  Location: [facility];  Service: Obstetrics/Gynecology;  Laterality: N/A;   • MINI-LAPAROTOMY  2020    subcutaneous endometrioma     OB History    Para Term  AB Living   1 1   1   1   SAB IAB Ectopic Molar Multiple Live Births             1      # Outcome Date GA Lbr Guru/2nd Weight Sex Delivery Anes PTL Lv   1  17 26w2d  754 g (1 lb 10.6 oz) F CS-Classical Gen Y BERNIE      Social History     Tobacco Use   • Smoking status: Never   • Smokeless tobacco: Never   Vaping Use   • Vaping Use: Never used   Substance Use Topics   • Alcohol use: Never   • Drug use: Never     Family History   Problem Relation Age of Onset   • Thyroid disease Mother    • Diabetes Father    • Thyroid disease Sister    • Malig Hyperthermia Neg Hx      Current Outpatient Medications on File Prior to Visit   Medication Sig Dispense Refill   • [DISCONTINUED] letrozole (FEMARA) 2.5 MG tablet Take 5mg per day on day 4-8 of cycle (Patient not taking: Reported on 2023) 10 tablet 0     No current facility-administered medications on file prior to visit.     No Known Allergies     Review of Systems      OBJECTIVE:   Vitals:    23 1439   BP: 121/81   Weight: 72.6 kg (160  "lb)   Height: 162.6 cm (64\")      Physical Exam  Constitutional:       General: She is not in acute distress.     Appearance: She is well-developed. She is not diaphoretic.   HENT:      Head: Normocephalic and atraumatic.   Eyes:      General: No scleral icterus.     Extraocular Movements: Extraocular movements intact.   Pulmonary:      Effort: Pulmonary effort is normal. No respiratory distress.   Skin:     General: Skin is warm and dry.   Neurological:      General: No focal deficit present.      Mental Status: She is alert and oriented to person, place, and time.   Psychiatric:         Mood and Affect: Mood normal.         Behavior: Behavior normal.         Thought Content: Thought content normal.         Judgment: Judgment normal.         ASSESSMENT/PLAN:     ICD-10-CM ICD-9-CM   1. Abnormal uterine bleeding (AUB)  N93.9 626.9   2. Infertility, anovulation  N97.0 628.0       I have reviewed this patient's and counseled her on the options for further workup and treatment of infertility.  We have discussed the options of further testing (including but not limited to semen analysis, hysterosalpingogram, laboratory evaluation), referral to reproductive endocrinology and infertility specialist, proceeding with ovulation induction for 1-2 cycles without further workup.  She was counseled that ovulation induction agents can increase the risk of certain factors such as multiple gestation.  We discussed that there are 2 medications use for ovulation induction: Clomiphene citrate and letrozole.  Letrozole is not FDA approved drug and the use for ovulation induction is off label.  She was counseled that proceeding with ovulation induction without further testing can increase the risk of certain factors such as a tubal pregnancy given unknown tubal patency.  Patient elects to continue letrozole.  She understands the risks and limitations of this decision.    For ovulation induction, she will take Provera 10 mg daily for 10 " days.  She was counseled to take the ovulation induction agent days 3 through 7 of her cycle.  We discussed use of ovulation prediction kits or serum progestin and the timing of intercourse.       Orders Placed This Encounter   Procedures   • POC Pregnancy, Urine     Order Specific Question:   Release to patient     Answer:   Routine Release       Return in about 6 weeks (around 7/7/2023).

## 2023-05-26 ENCOUNTER — OFFICE VISIT (OUTPATIENT)
Dept: OBSTETRICS AND GYNECOLOGY | Facility: CLINIC | Age: 28
End: 2023-05-26

## 2023-05-26 VITALS
WEIGHT: 160 LBS | DIASTOLIC BLOOD PRESSURE: 81 MMHG | HEIGHT: 64 IN | BODY MASS INDEX: 27.31 KG/M2 | SYSTOLIC BLOOD PRESSURE: 121 MMHG

## 2023-05-26 DIAGNOSIS — N97.0 INFERTILITY, ANOVULATION: ICD-10-CM

## 2023-05-26 DIAGNOSIS — N93.9 ABNORMAL UTERINE BLEEDING (AUB): Primary | ICD-10-CM

## 2023-05-26 RX ORDER — LETROZOLE 2.5 MG/1
TABLET, FILM COATED ORAL
Qty: 10 TABLET | Refills: 0 | Status: SHIPPED | OUTPATIENT
Start: 2023-05-26

## 2023-05-26 RX ORDER — LETROZOLE 2.5 MG/1
TABLET, FILM COATED ORAL
Qty: 10 TABLET | Refills: 0 | Status: SHIPPED | OUTPATIENT
Start: 2023-05-26 | End: 2023-05-26

## 2023-05-26 RX ORDER — VITAMIN C, CALCIUM, IRON, VITAMIN D3, VITAMIN E, VITAMIN B1, VITAMIN B2, VITAMIN B3, VITAMIN B6, FOLIC ACID, IODINE, ZINC, COPPER, DOCUSATE SODIUM, DOCOSAHEXAENOIC ACID (DHA) 27-1-50 MG
1 KIT ORAL DAILY
Qty: 60 EACH | Refills: 4 | Status: SHIPPED | OUTPATIENT
Start: 2023-05-26

## 2023-05-26 RX ORDER — MEDROXYPROGESTERONE ACETATE 10 MG/1
10 TABLET ORAL DAILY
Qty: 10 TABLET | Refills: 0 | Status: SHIPPED | OUTPATIENT
Start: 2023-05-26

## 2023-05-26 NOTE — PATIENT INSTRUCTIONS
- Take Provera 10mg daily x 10 days.  You should start your period 1-3 days after.    - the day you start your period is day 1 of your cycle  - take letrozole 5mg day 3-7 of your cycle    - take a pregnancy test around day 30

## 2023-08-22 ENCOUNTER — TELEPHONE (OUTPATIENT)
Dept: OBSTETRICS AND GYNECOLOGY | Facility: CLINIC | Age: 28
End: 2023-08-22

## 2023-08-22 NOTE — TELEPHONE ENCOUNTER
Caller: Iain Parkinson    Relationship to patient: Self    Best call back number: [phone]    Patient is needing: PATIENT CALLED AND REQUESTED A PRESCRIPTION FOR CLOMID - STATED THAT THE LETROZOLE ISN'T WORKING - SHE STATED THAT TODAY IS THE FIFTH DAY OF HER PERIOD AND SHE WOULD LIKE TO KNOW WHEN SHE SHOULD START THE CLOMID IF IT IS PRESCRIBED    PHARMACY: [facility], OUTER [facility]

## 2023-08-22 NOTE — TELEPHONE ENCOUNTER
HUB call. 5/26/23 AUB, infertility, ovulation. Requesting to change to clomid. The letrozole is not working. Today is 5th day of her period and she would like to know when she can start clomid, if it is prescribed. [facility] pharmacy. Thank you

## 2024-01-25 ENCOUNTER — TELEPHONE (OUTPATIENT)
Dept: OBSTETRICS AND GYNECOLOGY | Facility: CLINIC | Age: 29
End: 2024-01-25

## 2024-01-25 DIAGNOSIS — Z32.01 POSITIVE PREGNANCY TEST: Primary | ICD-10-CM

## 2024-01-25 NOTE — TELEPHONE ENCOUNTER
Please see HUB message. Would you like for pt to come in for HCG to confirm pregnancy? Please advise

## 2024-01-25 NOTE — TELEPHONE ENCOUNTER
Provider: DR DEL ROSARIO    Caller: EDEL DE LA GARZA    Phone Number: [phone]    Reason for Call: PATIENT IS CALLING FOR NEW OB SCHEDULING SHE HASN'T HAD A PERIOD SINCE LATE OCT//PATIENTS PERIODS ARE VERY IRREGULAR AND WAS REFERRED TO DR PARISH RODRÍGUEZ FOR FERTILITY ISSUES//HAS SEEN DR RODRÍGUEZ AND HAS A PROCEDURE SCHEDULED IN FEB BUT WAS SUPPOSED TO START BIRTH CONTROL//SINCE SHE DOESN'T HAVE REGULAR PERIODS BEFORE SHE STARTED THE BC SHE TOOK AN AT HOME TEST AND IT WAS POSITIVE//PATIENT REALLY ISNT SURE HOW FAR SHE COULD BE//WANTED TO CONSULT WITH DR DEL ROSARIO BEFORE CANCELLING THE PROCEDURE WITH DR RODRÍGUEZ//ADVISED DR DEL ROSARIO MAY WANT TO DO HCG LEVELS SINCE ISNT SURE OF LMP//PLEASE FOLLOW UP

## 2024-01-26 ENCOUNTER — TELEPHONE (OUTPATIENT)
Dept: OBSTETRICS AND GYNECOLOGY | Facility: CLINIC | Age: 29
End: 2024-01-26

## 2024-01-26 LAB — HCG INTACT+B SERPL-ACNC: NORMAL MIU/ML

## 2024-01-26 RX ORDER — PNV NO.95/FERROUS FUM/FOLIC AC 28MG-0.8MG
1 TABLET ORAL DAILY
Qty: 90 TABLET | Refills: 4 | Status: SHIPPED | OUTPATIENT
Start: 2024-01-26

## 2024-01-26 NOTE — TELEPHONE ENCOUNTER
Please let patient know that her hcg was over 46K.  She is welcome to schedule a New OB with an US in the next few weeks. Thanks!

## 2024-01-26 NOTE — TELEPHONE ENCOUNTER
----- Message from Iain Parkinson sent at 1/26/2024 12:49 PM EST -----  Regarding: Pregnancy vitamin.  Contact: [phone]  Hi Dr Moreno,  Can I start taking vitamin or folic acid’s??     Thanks!

## 2024-02-08 ENCOUNTER — INITIAL PRENATAL (OUTPATIENT)
Dept: OBSTETRICS AND GYNECOLOGY | Facility: CLINIC | Age: 29
End: 2024-02-08

## 2024-02-08 VITALS — SYSTOLIC BLOOD PRESSURE: 118 MMHG | WEIGHT: 167.8 LBS | BODY MASS INDEX: 28.8 KG/M2 | DIASTOLIC BLOOD PRESSURE: 78 MMHG

## 2024-02-08 DIAGNOSIS — Z98.891 HISTORY OF CLASSICAL CESAREAN SECTION: ICD-10-CM

## 2024-02-08 DIAGNOSIS — Z32.01 POSITIVE PREGNANCY TEST: Primary | ICD-10-CM

## 2024-02-08 DIAGNOSIS — O09.219 PRIOR PRETERM LABOR, ANTEPARTUM: ICD-10-CM

## 2024-02-08 RX ORDER — PROMETHAZINE HYDROCHLORIDE 12.5 MG/1
12.5 TABLET ORAL EVERY 6 HOURS PRN
Qty: 30 TABLET | Refills: 0 | Status: SHIPPED | OUTPATIENT
Start: 2024-02-08

## 2024-02-08 NOTE — PROGRESS NOTES
Initial OB Visit    Chief Complaint   Patient presents with    Initial Prenatal Visit     Us today   Last pap: 2022        Iain Parkinson is being seen today for her first obstetrical visit.  She is a 28 y.o.    15w0d gestation by ***  FOB: ***  This is *** a planned pregnancy.   OB History    Para Term  AB Living   2 1   1   1   SAB IAB Ectopic Molar Multiple Live Births             1      # Outcome Date GA Lbr Guru/2nd Weight Sex Delivery Anes PTL Lv   2 Current            1  17 26w2d  754 g (1 lb 10.6 oz) F CS-Classical Gen Y BERNIE       Current obstetric complaints: ***   Prior obstetric issues, potential pregnancy concerns: h/o primary classical CS at 25w for  labor, breech presentation  Family history of genetic issues (includes FOB): ***  Prior infections concerning in pregnancy (Rash, fever since LMP): ***  Varicella Hx:***  Prior genetic testing: ***  History of abnormal pap smears: ***  History of STIs: ***  History of HSV in self or partner? ***  Prepregnancy weight ***    Past Medical History:   Diagnosis Date    Endometriosis     Seasonal allergies     Uterine polyp        Past Surgical History:   Procedure Laterality Date     SECTION      D & C HYSTEROSCOPY N/A 2022    Procedure: DILATATION AND CURETTAGE HYSTEROSCOPY WITH MYOSURE,POLYPECTOMY;  Surgeon: Serina Moreno MD;  Location: [facility];  Service: Obstetrics/Gynecology;  Laterality: N/A;    MINI-LAPAROTOMY  2020    subcutaneous endometrioma         Current Outpatient Medications:     Prenatal Vit-Fe Fumarate-FA (Prenatal Vitamin) 27-0.8 MG tablet, Take 1 tablet by mouth Daily., Disp: 90 tablet, Rfl: 4    No Known Allergies    Social History     Socioeconomic History    Marital status:    Tobacco Use    Smoking status: Never    Smokeless tobacco: Never   Vaping Use    Vaping Use: Never used   Substance and Sexual Activity    Alcohol use: Never    Drug use: Never    Sexual  activity: Yes     Partners: Male       Family History   Problem Relation Age of Onset    Thyroid disease Mother     Diabetes Father     Thyroid disease Sister     Malig Hyperthermia Neg Hx        Review of systems   See HPI         Objective    /78   Wt 76.1 kg (167 lb 12.8 oz)   LMP 10/26/2023   BMI 28.80 kg/m²       General Appearance:    Alert, cooperative, in no acute distress, habitus ***   Head:    Normocephalic, without obvious abnormality, atraumatic   Eyes:            Lids and lashes normal, conjunctivae and sclerae normal, no   icterus, no pallor, corneas clear   Ears:    Ears appear intact with no abnormalities noted       Neck:   No adenopathy, supple, trachea midline, no thyromegaly   Back:     No kyphosis present, no scoliosis present,                       Lungs:     Clear to auscultation,respirations regular, even and                   unlabored    Heart:    Regular rhythm and normal rate, normal S1 and S2, no            murmur, no gallop, no rub, no click   Breast Exam:    No masses, No nipple discharge   Abdomen:     Normal bowel sounds, no masses, no organomegaly, soft        non-tender, non-distended, no guarding, no rebound                 tenderness   Genitalia:    Vulva - BUS-WNL, NEFG    Vagina - No discharge, No bleeding    Cervix - No Lesions, closed     Uterus - Consistent with *** weeks    Adnexa - No masses, NT    Pelvimetry - clinically adequate, gynecoid pelvis     Extremities:   Moves all extremities well, no edema, no cyanosis, no              redness   Pulses:   Pulses palpable and equal bilaterally   Skin:   No bleeding, bruising or rash   Lymph nodes:   No palpable adenopathy   Neurologic:   Sensation intact, A&O times 3      Assessment  (Z32.01) Positive pregnancy test - Plan: Urine Culture - Urine, Urine, Clean Catch, Drug Profile Urine - 9 Drugs - Urine, Clean Catch, OB Panel With HIV, Comprehensive Metabolic Panel, Hemoglobin A1c, Chlamydia trachomatis, Neisseria  gonorrhoeae, Trichomonas vaginalis, PCR - Swab, Cervix    (Z98.891) History of classical  section - Plan: Ambulatory Referral to MFM/Perinatology, OB Panel With HIV, Comprehensive Metabolic Panel, Hemoglobin A1c, Chlamydia trachomatis, Neisseria gonorrhoeae, Trichomonas vaginalis, PCR - Swab, Cervix    (O09.219) Prior 26w delivery for  labor - Plan: Ambulatory Referral to MFM/Perinatology, OB Panel With HIV, Comprehensive Metabolic Panel, Hemoglobin A1c, Chlamydia trachomatis, Neisseria gonorrhoeae, Trichomonas vaginalis, PCR - Swab, Cervix    Plan    Initial labs drawn, GC/CHL screen done  Patient is on Prenatal vitamins  Problem list reviewed and updated.  Reviewed routine prenatal care with the office to include but not limited to:   Questions regarding specific pregnancy activities.  Reviewed nature of practice and hospital.  Reviewed recommended follow up, importance of compliance with care. We reviewed testing in pregnancy including HIV testing and urine drug screen.    Reviewed aneuploidy screening and carrier genetic screening.  We reviewed limitations of testing, possibility of false positive/negative results, possible need for other tests as indicated.  ***  For contraception after this pregnancy, she is considering ***  Counseled on limitations of ultrasound in pregnancy in detecting aneuploidy/fetal anomalies    Reviewed Body mass index is 28.8 kg/m²..  In pregnancy, her recommended weight gain is ***.  In the first trimester, caloric demand is typically not increased.  In the second and third trimester, the increased demand is approximately 350 and 450 calories respectively.    All questions answered.   No follow-ups on file.      Serina Moreno MD

## 2024-02-08 NOTE — PATIENT INSTRUCTIONS
B6: Also known as pyridoxine - please take 10 to 25 mg orally every six to eight hours; the maximum treatment dose suggested for pregnant women is 200 mg/day.  Doxylamine: is available in some over-the-counter sleeping pills (eg, Unisom Sleep Tabs). One-half of the 25 mg over-the-counter tablet can be used as an antiemetic    Additionally, try the following over the counter products:  Hollie chews, hollie tea, hollie gum  Mint tea, peppermint gum or candy    If these do not work, we may need to try prescription medications.    Please contact us if unable to tolerate liquids (even sips of water) for over six to eight hours, have weight loss of over 10% of your body weight, blood in vomit, fever (temp of 100.4 or higher), or other concerning symptoms arise.        The following are CPT codes for the optional tests in pregnancy:  Cystic fibrosis screenin  Spinal Muscular atrophy screening 02888  Cell free DNA (Trisomy 21, 18, 13 and sex chromosomes) 56847    The ICD 10 code for most pregnancies is Z34.90   Travel During Pregnancy:  Always use seatbelts.  A lap belt should be worn below the abdomen (across the hips) and the shoulder belt should be worn across the center of your chest (between the breasts) away from your neck.  Do not put the shoulder belt under your arm or behind your back.  Pull any slack out of the belt.  Air travel is safe in most uncomplicated pregnancies, but we do not recommend air travel past 36 weeks.  Airlines may also have restrictions, so check with your airline before flying.  For some international flights, the travel cut off may be as early as 28 weeks gestation, and some airlines may require letters from your physician.  When going on long trips in car, plane, train, or bus, frequent ambulation is important to prevent blood clots in legs and/or lungs.  The following may help with prevention of blood clots in legs: Drink lots of fluid, wear loose-fitting clothing, walk and stretch  at regular intervals.    Avoid areas with Zika outbreaks.  For the latest information, you may visit: www.cdc.gov/travel/notices/.  Resources: , , Committee Opinion 455  Nutrition during pregnancy  Average weight gain during pregnancy is based on your pre-pregnancy body mass index (BMI).  See below for recommended weight gain:  Underweight (BMI <18.5), we recommend 28 to 40lb weight gain  Normal weight (BMI 18.5 to 24.9), we recommend a 25 to 35lb weight gain  Overweight (BMI 25-29.9), we recommend a 15 to 25lb weight gain  Obese (BMI >30), we recommend keeping weight gain under 20 lbs.    You should speak with your physician regarding your specific weight goals for this pregnancy.   Foods to avoid include:   Fish: avoid certain types of fish such as shark, swordfish, trino mackerel, tilefish.  Limit white (albacore) tuna to 6 oz per week.  Choose fish and shellfish such as shrimp, salmon, catfish, [place]f.  Food-borne illness: Pregnant women are much more likely to get Listeriosis than non-pregnant women.  To help prevent this, avoid eating unpasteurized milk and unpasteurized milk products, hot dogs/lunch meat/cold cuts unless they are heated until steaming right before serving, refrigerated meat spreads, refrigerated smoked seafood, raw or undercooked seafood/eggs/meat.   Vitamin D helps development of the baby’s bones and teeth.  Good sources of Vitamin D include milk fortified with Vitamin D and fatty fish such as salmon.    Folic acid, also known as folate, helps develop the baby’s brain and spine.  You should make sure your vitamin contains extra folic acid - at least 400mcg.    Iron helps make red blood cells.  You need to make extra red blood cell sin pregnancy.  We recommend eating Iron-rich foods such as lean red meat, poultry, fish, dried beans and peas, iron-fortified cereals, and prune juice.  You may be recommended an iron supplement.  If so, it is absorbed more easily if taken with  vitamin C-rich foods such as citrus fruits or tomatoes.    It is important to eat a well balanced diet.  A good recourse for nutrition recommendations is: www.choosemyplate.gov.  Limit caffeine intake to 200mg daily.  Some coffees/teas/sodas have very different levels of caffeine per serving, so check the nutrition labeling.   Resources: , Committee Opinion 548  Exercise during pregnancy  If you are healthy and your pregnancy is normal, it is safe to continue or start most types of exercise.   Physical activity does not increase your risk of miscarriage, low birth weight or early delivery, but you should discuss specific limitations or any complications with your physician.    Benefits of exercise during pregnancy include: reduced back pain, less constipation, promotes overall health and healthy weight gain which may decrease risks for certain pregnancy complications such as diabetes and/or preeclampsia.  The CDC recommends 150 minutes of moderate intensity aerobic exercise per week.  Moderate intensity means that you are moving enough to raise your heart rate and start sweating, but you can talk normally.  Brisk walking, swimming/water workouts, modified yoga/pilates, and use of elliptical machines and/or stationary bikes are examples of aerobic activity.    Precautions:  Stay hydrated.  Drink plenty of water before, during and after your workout  Wear supportive clothing such as a sports bra  Do not become overheated.  Do not exercise outside when it is very hot or humid  Avoid lying flat on your back as much as possible  AVOID contact sports, skydiving, sports that risk falling (such as skiing, surfing, off-road cycling, gymnastics, horseback riding), hot yoga/hot pilates, scuba diving  Stop exercising if you experience: bleeding from the vagina, feeling dizzy/faint, shortness of breath before starting exercise, chest pain, headache, muscle weakness, calf pain or swelling, regular uterine contractions,  "fluid leaking from the vagina.    Postpartum exercise: Continuing exercise after you deliver your baby will help boost your energy, strengthen muscles, promote better sleep, and relieve stress.  It also may be useful in preventing postpartum depression.  150 minutes of moderate-intensity aerobic activity is recommended.  Types of exercise and when you can start a regular exercise routine may be limited by the type of delivery you had.  Please discuss with your physician prior to resuming or starting exercise.    Resources: ,   Back pain during pregnancy  Back pain is very common during pregnancy.  It may arise due to strain on your back muscles, weakness of the abdominal muscles, and pregnancy hormones.    To prevent back pain:  Wear shoes with good support. Flat shoes and high heels may not have good arch support.  Consider a firm mattress  Use good lifting practices.  Do not bend from the waist to pick things up.  Squat down and bend your knees, keeping a straight back.  Sit in chairs with good back support or use a small pillow behind your lower back.    Sleep on your side with one or two pillows between your legs  To ease back pain:   Exercise can help stretch strained muscles and strengthen weak muscles to promote good posture  Contact your health care provider with severe pain, pain that persists for more than 2 weeks, fever, burning during urination, or vaginal bleeding.  Resources:   Optional testing, genetic testing  There are essentially three types of \"optional\" genetic tests.  These may or may not be covered by insurance.  The CPT codes for these tests are below and you can use these to check insurance coverage.    Carrier screening- a blood test that checks for genes associated with genetic diseases which you may not have symptoms of but could carry the gene.  The recommended genes to test for carrier states include:    -Cystic fibrosis (CF): This is an inherited disease that can " result in serious medical consequences- specifically pulmonary morbidity.  It requires 2 mutations to have an affected individual so patients with one mutation may not have any symptoms.  Cystic fibrosis mutations can be carried in families without a history of cystic fibrosis.  The screening test is done through blood work and is only needed once in a lifetime.  A negative screen does not give you a 0% risk of having a mutation but significantly lowers the risk.  A positive screen would require testing of the father of the baby and if positive, would be recommended for genetic counseling and possibly amniocentesis.     - Spinal muscular atrophy:  This is his inherited neurologic disease which can result in considerable deficits in the nervous system.  Testing is done through a blood sample.  Based on the results of the tests, her risk for her baby to be affected may be reduced but cannot be lowered to zero.  This testing is also only needed once in a lifetime, and so is typically offered with your first pregnancy.     - Fragile X: this is an inherited form of autism (but not the only type of autism). It is related to a sex chromosome, the X chromosome. Testing is done with a blood sample. If you tested positive as a carrier, the risk would depend in part on the gender of the baby.        2. Aneuploidy testing via non invasive prenatal testing, or cell free DNA ([id number] is a brand of that test): This is done through blood.  Cell free DNA can be done after 9-10 weeks and will screen for Trisomy 21 (Down Syndrome), Trisomy 13 (Patau syndrome), and Trisomy 18 (Edward Syndrome) as well as sex chromosomes.  If positive, a screening test is not definitive and further testing would be warranted, such as an amniocentesis.  If negative, it substantially lowers the risk of aneuploidy but does not take it to zero.    3. Hemoglobinopathy screening: done through blood sample; can detect sickle cell trait, sickle cell disease,  and some thalassemias.  It is not able to detect all thalassemias.  Only needed once in a lifetime.    The following are CPT codes for the optional tests in pregnancy:  Cystic fibrosis screenin  Spinal Muscular atrophy screening 73886  Cell free DNA (Trisomy 21, 18, 13 and sex chromosomes) 01212    The ICD 10 code for most pregnancies is Z34.90

## 2024-02-08 NOTE — PROGRESS NOTES
Initial OB Visit    Chief Complaint   Patient presents with    Initial Prenatal Visit     Us today   Last pap: 2022        Iain Parkinson is being seen today for her first obstetrical visit.  She is a 28 y.o.  8w2d gestation by today's ultrasound; prefers to use her  as an interpretor.    FOB: Víctor, here with her today  This is a planned pregnancy.   OB History    Para Term  AB Living   2 1   1   1   SAB IAB Ectopic Molar Multiple Live Births             1      # Outcome Date GA Lbr Guru/2nd Weight Sex Delivery Anes PTL Lv   2 Current            1  17 26w2d  754 g (1 lb 10.6 oz) F CS-Classical Gen Y BERNIE       Current obstetric complaints: nausea- not taking anything. Had it with her last pregnancy and did not take anything   Prior obstetric issues, potential pregnancy concerns: 26 week  labor with classical  section; her sister had two premature babies due to  labor (this happened in [place] as well).   Family history of genetic issues (includes FOB): denies  Prior infections concerning in pregnancy (Rash, fever since LMP):   Varicella Hx: immune by vaccination  Prior genetic testing: unsure  History of abnormal pap smears: denies  History of STIs: denies  History of HSV in self or partner? denies    Past Medical History:   Diagnosis Date    Endometriosis     Seasonal allergies     Uterine polyp        Past Surgical History:   Procedure Laterality Date     SECTION      D & C HYSTEROSCOPY N/A 2022    Procedure: DILATATION AND CURETTAGE HYSTEROSCOPY WITH MYOSURE,POLYPECTOMY;  Surgeon: Serina Moreno MD;  Location: [facility];  Service: Obstetrics/Gynecology;  Laterality: N/A;    MINI-LAPAROTOMY  2020    subcutaneous endometrioma         Current Outpatient Medications:     Prenatal Vit-Fe Fumarate-FA (Prenatal Vitamin) 27-0.8 MG tablet, Take 1 tablet by mouth Daily., Disp: 90 tablet, Rfl: 4    No Known Allergies    Social  History     Socioeconomic History    Marital status:    Tobacco Use    Smoking status: Never    Smokeless tobacco: Never   Vaping Use    Vaping Use: Never used   Substance and Sexual Activity    Alcohol use: Never    Drug use: Never    Sexual activity: Yes     Partners: Male       Family History   Problem Relation Age of Onset    Thyroid disease Mother     Diabetes Father     Thyroid disease Sister     Malig Hyperthermia Neg Hx        Review of systems   See HPI         Objective    /78   Wt 76.1 kg (167 lb 12.8 oz)   LMP 10/26/2023   BMI 28.80 kg/m²       General Appearance:    Alert, cooperative, in no acute distress, habitus normal   Head:    Normocephalic, without obvious abnormality, atraumatic   Eyes:            Lids and lashes normal, conjunctivae and sclerae normal, no   icterus, no pallor, corneas clear   Ears:    Ears appear intact with no abnormalities noted       Neck:   No adenopathy, supple, trachea midline, no thyromegaly   Back:     No kyphosis present, no scoliosis present,                       Lungs:     Clear to auscultation,respirations regular, even and                   unlabored    Heart:    Regular rhythm and normal rate, normal S1 and S2, no            murmur, no gallop, no rub, no click   Breast Exam:    No masses, No nipple discharge   Abdomen:     Normal bowel sounds, no masses, no organomegaly, soft        non-tender, non-distended, no guarding, no rebound                 tenderness   Genitalia:    Vulva - BUS-WNL, NEFG    Vagina - No discharge, No bleeding    Cervix - No Lesions, closed     Uterus - Consistent with 8 weeks    Adnexa - No masses, NT    Pelvimetry - clinically adequate, gynecoid pelvis     Extremities:   Moves all extremities well, no edema, no cyanosis, no              redness   Pulses:   Pulses palpable and equal bilaterally   Skin:   No bleeding, bruising or rash   Lymph nodes:   No palpable adenopathy   Neurologic:   Sensation intact, A&O times 3       Assessment  (Z32.01) Positive pregnancy test - Plan: Urine Culture - Urine, Urine, Clean Catch, Drug Profile Urine - 9 Drugs - Urine, Clean Catch, OB Panel With HIV, Comprehensive Metabolic Panel, Hemoglobin A1c, Chlamydia trachomatis, Neisseria gonorrhoeae, Trichomonas vaginalis, PCR - Swab, Cervix    (Z98.891) History of classical  section - Plan: Ambulatory Referral to M/Perinatology, OB Panel With HIV, Comprehensive Metabolic Panel, Hemoglobin A1c, Chlamydia trachomatis, Neisseria gonorrhoeae, Trichomonas vaginalis, PCR - Swab, Cervix    (O09.219) Prior 26w delivery for  labor - Plan: Ambulatory Referral to MFM/Perinatology, OB Panel With HIV, Comprehensive Metabolic Panel, Hemoglobin A1c, Chlamydia trachomatis, Neisseria gonorrhoeae, Trichomonas vaginalis, PCR - Swab, Cervix    Plan    Initial labs drawn, GC/CHL screen done  Patient is on Prenatal vitamins  Problem list reviewed and updated.  Reviewed routine prenatal care with the office to include but not limited to:   Questions regarding specific pregnancy activities.  Reviewed nature of practice and hospital.  Reviewed recommended follow up, importance of compliance with care. We reviewed testing in pregnancy including HIV testing and urine drug screen.    Reviewed aneuploidy screening and carrier genetic screening.  We reviewed limitations of testing, possibility of false positive/negative results, possible need for other tests as indicated.  She will consider and let us know.  Counseled on limitations of ultrasound in pregnancy in detecting aneuploidy/fetal anomalies    Reports taking metformin x 1 week then stopping. She was told sugar was high but they have checked at home and it has been normal. Will get A1C and CMP with labs  Reviewed h/o  labor, classical CS. Plan for repeat CS late  and serial cervical lengths, consider progesterone.    Nausea, vomiting- recommended B6,doxylamine. Phenergan PRN    All questions  answered.   Return in about 4 weeks (around 3/7/2024).      Serina Moreno MD

## 2024-02-10 LAB
AMPHETAMINES UR QL SCN: NEGATIVE NG/ML
BACTERIA UR CULT: NO GROWTH
BACTERIA UR CULT: NORMAL
BARBITURATES UR QL SCN: NEGATIVE NG/ML
BENZODIAZ UR QL: NEGATIVE NG/ML
BZE UR QL: NEGATIVE NG/ML
C TRACH RRNA SPEC QL NAA+PROBE: NEGATIVE
CANNABINOIDS UR QL SCN: NEGATIVE NG/ML
METHADONE UR QL SCN: NEGATIVE NG/ML
N GONORRHOEA RRNA SPEC QL NAA+PROBE: NEGATIVE
OPIATES UR QL: NEGATIVE NG/ML
PCP UR QL SCN: NEGATIVE NG/ML
PROPOXYPH UR QL SCN: NEGATIVE NG/ML
T VAGINALIS RRNA SPEC QL NAA+PROBE: NEGATIVE

## 2024-03-07 ENCOUNTER — ROUTINE PRENATAL (OUTPATIENT)
Dept: OBSTETRICS AND GYNECOLOGY | Facility: CLINIC | Age: 29
End: 2024-03-07
Payer: MEDICAID

## 2024-03-07 VITALS — SYSTOLIC BLOOD PRESSURE: 122 MMHG | WEIGHT: 164 LBS | BODY MASS INDEX: 28.15 KG/M2 | DIASTOLIC BLOOD PRESSURE: 71 MMHG

## 2024-03-07 DIAGNOSIS — Z3A.12 12 WEEKS GESTATION OF PREGNANCY: Primary | ICD-10-CM

## 2024-03-07 DIAGNOSIS — O09.219 PRIOR PRETERM LABOR, ANTEPARTUM: ICD-10-CM

## 2024-03-07 DIAGNOSIS — Z98.891 HISTORY OF CLASSICAL CESAREAN SECTION: ICD-10-CM

## 2024-03-07 LAB
GLUCOSE UR STRIP-MCNC: NEGATIVE MG/DL
PROT UR STRIP-MCNC: NEGATIVE MG/DL

## 2024-03-07 NOTE — PROGRESS NOTES
Chief Complaint   Patient presents with    Routine Prenatal Visit      Iain Parkinson is a 28 y.o.  at 12w2d  here for routine OB visit  Reports no issues    /71   Wt 74.4 kg (164 lb)   LMP 10/26/2023   BMI 28.15 kg/m²        Gen: NAD, well appearing  Abd: nontender    See OB Flowsheet    ASSESSMENT/PLAN:  Diagnoses and all orders for this visit:    1. 12 weeks gestation of pregnancy (Primary)  -     POC Urinalysis Dipstick  -     [id number] PLUS Core+SCA - Blood,    2. Prior 26w delivery for  labor    3. History of classical  section      She is interested in cell free DNA. Aware of limitations of testing, possibility of need for additional testing (such as amniocentesis), possibility of out of pocket expense, possibility of false positive/false negative results.    Was supposed to get OB panel today but will get tomorrow  Has MFM follow up on 24 for h/o  labor requiring classical cs at 26 weeks.   Aware of plan for late  delivery if no earlier indication.  Aware of recommendation for cervical length screening and will get US with MFM    Return in about 4 weeks (around 2024).

## 2024-03-10 LAB
ABO GROUP BLD: NORMAL
ALBUMIN SERPL-MCNC: 4.1 G/DL (ref 4–5)
ALBUMIN/GLOB SERPL: 1.4 {RATIO} (ref 1.2–2.2)
ALP SERPL-CCNC: 68 IU/L (ref 44–121)
ALT SERPL-CCNC: 15 IU/L (ref 0–32)
AST SERPL-CCNC: 14 IU/L (ref 0–40)
BASOPHILS # BLD AUTO: 0 X10E3/UL (ref 0–0.2)
BASOPHILS NFR BLD AUTO: 0 %
BILIRUB SERPL-MCNC: 0.3 MG/DL (ref 0–1.2)
BLD GP AB SCN SERPL QL: NEGATIVE
BUN SERPL-MCNC: 7 MG/DL (ref 6–20)
BUN/CREAT SERPL: 12 (ref 9–23)
CALCIUM SERPL-MCNC: 9.7 MG/DL (ref 8.7–10.2)
CHLORIDE SERPL-SCNC: 102 MMOL/L (ref 96–106)
CO2 SERPL-SCNC: 21 MMOL/L (ref 20–29)
CREAT SERPL-MCNC: 0.57 MG/DL (ref 0.57–1)
EGFRCR SERPLBLD CKD-EPI 2021: 125 ML/MIN/1.73
EOSINOPHIL # BLD AUTO: 0.1 X10E3/UL (ref 0–0.4)
EOSINOPHIL NFR BLD AUTO: 1 %
ERYTHROCYTE [DISTWIDTH] IN BLOOD BY AUTOMATED COUNT: 15.3 % (ref 11.7–15.4)
GLOBULIN SER CALC-MCNC: 3 G/DL (ref 1.5–4.5)
GLUCOSE SERPL-MCNC: 75 MG/DL (ref 70–99)
HBA1C MFR BLD: 5.3 % (ref 4.8–5.6)
HBV SURFACE AG SERPL QL IA: NEGATIVE
HCT VFR BLD AUTO: 38.6 % (ref 34–46.6)
HCV IGG SERPL QL IA: NON REACTIVE
HGB BLD-MCNC: 13.1 G/DL (ref 11.1–15.9)
HIV 1+2 AB+HIV1 P24 AG SERPL QL IA: NON REACTIVE
IMM GRANULOCYTES # BLD AUTO: 0 X10E3/UL (ref 0–0.1)
IMM GRANULOCYTES NFR BLD AUTO: 0 %
LYMPHOCYTES # BLD AUTO: 2.8 X10E3/UL (ref 0.7–3.1)
LYMPHOCYTES NFR BLD AUTO: 30 %
MCH RBC QN AUTO: 28.8 PG (ref 26.6–33)
MCHC RBC AUTO-ENTMCNC: 33.9 G/DL (ref 31.5–35.7)
MCV RBC AUTO: 85 FL (ref 79–97)
MONOCYTES # BLD AUTO: 0.6 X10E3/UL (ref 0.1–0.9)
MONOCYTES NFR BLD AUTO: 6 %
NEUTROPHILS # BLD AUTO: 5.7 X10E3/UL (ref 1.4–7)
NEUTROPHILS NFR BLD AUTO: 63 %
PLATELET # BLD AUTO: 316 X10E3/UL (ref 150–450)
POTASSIUM SERPL-SCNC: 4.3 MMOL/L (ref 3.5–5.2)
PROT SERPL-MCNC: 7.1 G/DL (ref 6–8.5)
RBC # BLD AUTO: 4.55 X10E6/UL (ref 3.77–5.28)
RH BLD: POSITIVE
RPR SER QL: NON REACTIVE
RUBV IGG SERPL IA-ACNC: 8.97 INDEX
SODIUM SERPL-SCNC: 138 MMOL/L (ref 134–144)
WBC # BLD AUTO: 9.4 X10E3/UL (ref 3.4–10.8)

## 2024-03-15 LAB
CFDNA.FET/CFDNA.TOTAL SFR FETUS: NORMAL %
CITATION REF LAB TEST: NORMAL
FET 13+18+21+X+Y ANEUP PLAS.CFDNA: NEGATIVE
FET CHR 21 TS PLAS.CFDNA QL: NEGATIVE
FET MS X RISK WBC.DNA+CFDNA QL: NOT DETECTED
FET SEX PLAS.CFDNA DOSAGE CFDNA: NORMAL
FET TS 13 RISK PLAS.CFDNA QL: NEGATIVE
FET TS 18 RISK WBC.DNA+CFDNA QL: NEGATIVE
FET X + Y ANEUP RISK PLAS.CFDNA SEQ-IMP: NOT DETECTED
GA EST FROM CONCEPTION DATE: NORMAL D
GESTATIONAL AGE > 9:: YES
LAB DIRECTOR NAME PROVIDER: NORMAL
LAB DIRECTOR NAME PROVIDER: NORMAL
LABORATORY COMMENT REPORT: NORMAL
LIMITATIONS OF THE TEST: NORMAL
NEGATIVE PREDICTIVE VALUE: NORMAL
NOTE: NORMAL
PERFORMANCE CHARACTERISTICS: NORMAL
POSITIVE PREDICTIVE VALUE: NORMAL
REF LAB TEST METHOD: NORMAL
TEST PERFORMANCE INFO SPEC: NORMAL

## 2024-03-18 ENCOUNTER — TELEPHONE (OUTPATIENT)
Dept: OBSTETRICS AND GYNECOLOGY | Facility: CLINIC | Age: 29
End: 2024-03-18
Payer: MEDICAID

## 2024-03-18 NOTE — TELEPHONE ENCOUNTER
----- Message from Serina Moreno MD sent at 3/15/2024  6:48 PM EDT -----  Please let patient know that aneuploidy screening was negative for trisomy 21/18/13.  If she wishes to know sex, testing shows consistent with female.

## 2024-03-27 ENCOUNTER — TRANSCRIBE ORDERS (OUTPATIENT)
Dept: ULTRASOUND IMAGING | Facility: HOSPITAL | Age: 29
End: 2024-03-27
Payer: COMMERCIAL

## 2024-03-27 DIAGNOSIS — Z87.51 HISTORY OF PRETERM DELIVERY: Primary | ICD-10-CM

## 2024-03-27 DIAGNOSIS — Z87.51 HISTORY OF PRETERM LABOR: ICD-10-CM

## 2024-04-01 ENCOUNTER — HOSPITAL ENCOUNTER (OUTPATIENT)
Dept: ULTRASOUND IMAGING | Facility: HOSPITAL | Age: 29
Discharge: HOME | End: 2024-04-01
Admitting: OBSTETRICS & GYNECOLOGY
Payer: COMMERCIAL

## 2024-04-01 ENCOUNTER — OFFICE VISIT (OUTPATIENT)
Dept: OBSTETRICS AND GYNECOLOGY | Facility: CLINIC | Age: 29
End: 2024-04-01
Payer: COMMERCIAL

## 2024-04-01 VITALS
WEIGHT: 170 LBS | HEART RATE: 103 BPM | DIASTOLIC BLOOD PRESSURE: 78 MMHG | TEMPERATURE: 98.2 F | BODY MASS INDEX: 29.18 KG/M2 | SYSTOLIC BLOOD PRESSURE: 124 MMHG

## 2024-04-01 DIAGNOSIS — O09.219 PRIOR PRETERM LABOR, ANTEPARTUM: Primary | ICD-10-CM

## 2024-04-01 DIAGNOSIS — Z98.891 HISTORY OF CLASSICAL CESAREAN SECTION: ICD-10-CM

## 2024-04-01 DIAGNOSIS — Z87.51 HISTORY OF PRETERM LABOR: ICD-10-CM

## 2024-04-01 PROCEDURE — 76805 OB US >/= 14 WKS SNGL FETUS: CPT

## 2024-04-01 PROCEDURE — 99214 OFFICE O/P EST MOD 30 MIN: CPT | Performed by: NURSE PRACTITIONER

## 2024-04-01 PROCEDURE — 76817 TRANSVAGINAL US OBSTETRIC: CPT

## 2024-04-01 RX ORDER — PROGESTERONE 200 MG/1
200 CAPSULE ORAL DAILY
Qty: 90 CAPSULE | Refills: 2 | Status: SHIPPED | OUTPATIENT
Start: 2024-04-01

## 2024-04-01 NOTE — PROGRESS NOTES
Pt reports that she is doing well and denies vaginal bleeding, cramping, contractions or LOF at this time. Not feeling fetal movements at this point in pregnancy. Reviewed when to call OB office or present to L&D for evaluation with symptoms such as vaginal bleeding, LOF or ctxs. Pt verbalized understanding. Denies HA, visual changes or epigastric pain. Denies any additional complaints at time of appointment. Next OB appointment scheduled for 4/4.    Vitals:    04/01/24 1306   BP: 124/78   Pulse: 103   Temp: 98.2 °F (36.8 °C)

## 2024-04-01 NOTE — LETTER
2024     Serina Moreno MD  [address]  [address]  [facility] [address]    Patient: Iain Parkinson   YOB: 1995   Date of Visit: 2024       Dear Serina Moreno MD,    Thank you for referring Iain Parkinson to me for evaluation. Below is a copy of my consult note.    If you have questions, please do not hesitate to call me. I look forward to following Iain along with you.         Sincerely,        REED Watson        CC: No Recipients                          MATERNAL FETAL MEDICINE CONSULT Note    Dear Dr Serina Moreno MD:    Thank you for your kind referral of Iain Parkinson.  As you know, she is a 28 y.o. G 2 P 0101 at 15  6/7 weeks gestation (Estimated Date of Delivery: 24). This is a consult.     Her antepartum course is complicated by:  H/O  delivery    Aneuploidy Screening: MaterniT 21 - Negative    HPI: Today, she denies headache, blurry vision, RUQ pain. No vaginal bleeding, no contractions.     Review of History:  Past Medical History:   Diagnosis Date   • Endometriosis    • Seasonal allergies    • Uterine polyp      Past Surgical History:   Procedure Laterality Date   •  SECTION     • D & C HYSTEROSCOPY N/A 2022    Procedure: DILATATION AND CURETTAGE HYSTEROSCOPY WITH MYOSURE,POLYPECTOMY;  Surgeon: Sreina Moreno MD;  Location: [facility];  Service: Obstetrics/Gynecology;  Laterality: N/A;   • MINI-LAPAROTOMY  2020    subcutaneous endometrioma     Social History     Socioeconomic History   • Marital status:    Tobacco Use   • Smoking status: Never   • Smokeless tobacco: Never   Vaping Use   • Vaping status: Never Used   Substance and Sexual Activity   • Alcohol use: Never   • Drug use: Never   • Sexual activity: Yes     Partners: Male     Family History   Problem Relation Age of Onset   • Thyroid disease Mother    • Diabetes Father    • Thyroid disease Sister    • Malig Hyperthermia Neg Hx       No Known Allergies    Current Outpatient Medications on File Prior to Visit   Medication Sig Dispense Refill   • Prenatal Vit-Fe Fumarate-FA (Prenatal Vitamin) 27-0.8 MG tablet Take 1 tablet by mouth Daily. 90 tablet 4   • promethazine (PHENERGAN) 12.5 MG tablet Take 1 tablet by mouth Every 6 (Six) Hours As Needed for Nausea or Vomiting. 30 tablet 0     No current facility-administered medications on file prior to visit.        Past obstetric, gynecological, medical, surgical, family and social history reviewed.  Relevant lab work and imaging reviewed.    Review of systems  Constitutional:  denies fever, chills, malaise.   ENT/Mouth:  denies sore throat, tinnitus  Eyes: denies vision changes/pain  CV:  denies chest pain  Respiratory:  denies cough/SOB  GI:  denies N/V, diarrhea, abdominal pain.    :   denies dysuria  Skin:  denies lesions or pruritus   Neuro:  denies weakness, focal neurologic symptoms    Vitals:    24 1306   BP: 124/78   BP Location: Right arm   Patient Position: Sitting   Pulse: 103   Temp: 98.2 °F (36.8 °C)   TempSrc: Temporal   Weight: 77.1 kg (170 lb)       PHYSICAL EXAM   GENERAL: Not in acute distress, AAOx3, pleasant  CARDIO: regular rate and rhythm  PULM: symmetric chest rise, speaking in complete sentences without difficulty  NEURO: awake, alert and oriented to person, place, and time  ABDOMINAL: No fundal tenderness, no rebound or guarding, gravid  EXTREMITIES: no bilateral lower extremity edema/tenderness  SKIN: Warm, well-perfused      ULTRASOUND   Please view full ultrasound note on Imaging tab in ViewPoint.  Cephalic presentation  Posterior placenta  MVP 4.7 cm, which is normal   g (AC 52%)  Normal appearing limited anatomy  Transvaginal cervical length is 3.3 cm    ASSESSMENT/COUNSELIN y.o. G 2 P 0101 at 15  6/7 weeks gestation (Estimated Date of Delivery: 24)    -Pregnancy  [ X ] stable  [   ] improving [  ] worsening    Diagnoses and all orders for this visit:    1. Prior 26w  "delivery for  labor (Primary)    2. History of classical  section    Other orders  -     Progesterone (Prometrium) 200 MG capsule; Insert 1 capsule into the vagina Daily.  Dispense: 90 capsule; Refill: 2         History of  delivery  Hx of delivery at 26 weeks     She had bleeding and went to the hospital, she was 1 cm dilated with bulging bag of water. She was delivered by . This happened about 7 years ago.  She does not know if she had a short cervix or not prior and says she was never told this.   We discussed that in this setting I would recommend serial cervical lengths.  We discussed that serial cervical lengths in a questionable hx of cervical incompetence should still detect in most scenarios women who would benefit from a cerclage without doing an unnecessary cerclage in a woman who may not have cervical incompetence.  She understands this.     We will do the following protocol.   If CL =30 mm, repeat every 2 weeks until 23 6/7 weeks  If CL 25-29 mm, repeat every 1 week until 23  6/7 weeks  If CL <25 mm, offer cerclage placement     Extremely short cervical lengths, =5 mm, prior to 28 weeks may be associated with a significant enough likelihood of  birth within 2 weeks to warrant inpatient management and steroid administration (mean latency with CL = zero is 3 weeks, 36% risk of delivery within 2 weeks.  The patient was counseled that any history of a prior spontaneous  birth before 37 weeks does come with an inherent risk of recurrence with future pregnancies.  Intervention strategies to help reduce this recurrence risk were reviewed with the patient in great detail.  The current standard of care is to offer weekly 17-Hydroxyprogesterone caproate between 16 and 37 weeks gestation to help prevent a recurrent spontaneous  birth VS vaginal progesterone.       [facility] Statement on 17OHP:               \"In late 2019, results from the Progestin's Role in " "Optimizing  Gestation (PROLONG) trial were              published showing no benefit of weekly injections of 17-alpha hydroxyprogesterone caproate (17-)HPC)              from 16-20 weeks of gestation in women with a history of a alcala PTB in reducing the rates of              subsequent PTB and  morbidity. The Society for Maternal-Fetal Medicine believes that the              differences in these results from the earlier Adelia et al trial, which did show a benefit of 17-OHPC in              reducing the rate of spontaneous PTB (sPTB), may be at least partially explained by differences in study              populations. [facility] concludes that it is reasonable for providers to use 17-OHPC in women with a profile              more representative of the very-high-risk population reported in the Meis trial. For all women at risk of              recurrent sPTB, the risk/benefit discussion should incorporate a shared decision-making approach,              taking into account the lack of short-term safety concerns but uncertainty regarding benefit.\"     EPPPIC Trial : published in Lancet 2021, evaluated efficacy of 17OHP and vaginal progesterone vs no treatment.  Including 31 trials with data from 11,644 women and 16,185 offspring, the IPD meta-analysis compared vaginal progesterone, injectable 17-alpha hydroxyprogesterone caproate (17-OHPC),and oral progesterone with no treatment, or with each other, in asymptomatic women at risk of  birth (PTB).      Data from the PROLONG trial were included in this meta-analysis. The results showed that compared with those who received no treatment, women with alcala pregnancies at high risk for PTB due to prior spontaneous PTB (sPTB) or short cervix who received 17-OHPC or vaginal progesterone were less likely to deliver before 34 weeks of gestation (17-OHPC five trials, 3053 women; relative risk [RR] 0.83, 95% CI, 0.68-1.01] and vaginal progesterone " (nine trials, 3769 women; RR 0.78, 95% CI, 0.68-0.90]).  Due to the lack of a significant interaction between the type of treatment and effect on  birth, the study authors concluded that there was a benefit to both 17-OHPC and vaginal progesterone in reducing the risk of  birth.     She had a normal cervical length today which is reassuring.  We did spend some time talking about the risk of PPROM and PTD in women with hx PTB.     We discussed the strong association of a shortened cervix and the association of  birth. There is no threshold below which the patient always delivers , but when the cervix is measuring less than 13mm or if the cervix length becomes immeausurable than there is a 75% chance of delivery at less than 32 weeks.     Pt and  declined the use of  services.    UPDATE 24  Would like to check cervical length again in 2 weeks and then see Dr Roach to ask any further cerclage questions they may have.   Pt reports her sister had a cerclage and still delivered at 26 weeks - pt states she had an infection from cerclage placement.  Discussed with pt that Dr Roach will typically admit the day before (unless its an emergent case) and do IV antibiotics prior to cerclage placement and then continue antibiotics after cerclage until course completed.  Pt states she had scant pink discharge that she noticed a few days ago, but she had over exerted herself and was doing more than normal.   Recommend light activity, nothing strenuous, no heavy lifting, avoid being on feet for extended periods of time, etc.   All of their questions were answered. States understanding / denies further questions.        Summary of Plan  -Serial cervical lengths as per above protocol until 24 weeks  -Vaginal progesterone 200 mg QHS until 36 weeks  -Serial growth ultrasounds every 4 weeks (by primary OB)   -Starting at 28 - 32 weeks: Weekly fetal  surveillance until  delivery   -Starting at 28 weeks: Fetal movement instructions given continue daily until delivery; instructed to report to labor and delivery if cannot achieve more than 10 kicks in one hour or if she perceives a decrease in fetal movement    Follow-up: 2 weeks cervical length - Pt to see Dr Roach - would like to discuss cerclage procedure    Thank you for the consult and opportunity to care for this patient.  Please feel free to reach out with any questions or concerns.      I spent 45 minutes caring for this patient on this date of service. This time includes time spent by me in the following activities: preparing for the visit, reviewing tests, obtaining and/or reviewing a separately obtained history, performing a medically appropriate examination and/or evaluation, counseling and educating the patient/family/caregiver and independently interpreting results and communicating that information with the patient/family/caregiver with greater than 50% spent in counseling and coordination of care.     REED Cueva  Maternal Fetal Medicine-[facility]  Office: [phone]  [email or web address]

## 2024-04-01 NOTE — PROGRESS NOTES
MATERNAL FETAL MEDICINE CONSULT Note    Dear Dr Serina Moreno MD:    Thank you for your kind referral of Iain Parkinson.  As you know, she is a 28 y.o. G 2 P 0101 at 15  6/7 weeks gestation (Estimated Date of Delivery: 24). This is a consult.     Her antepartum course is complicated by:  H/O  delivery    Aneuploidy Screening: MaterniT 21 - Negative    HPI: Today, she denies headache, blurry vision, RUQ pain. No vaginal bleeding, no contractions.     Review of History:  Past Medical History:   Diagnosis Date    Endometriosis     Seasonal allergies     Uterine polyp      Past Surgical History:   Procedure Laterality Date     SECTION      D & C HYSTEROSCOPY N/A 2022    Procedure: DILATATION AND CURETTAGE HYSTEROSCOPY WITH MYOSURE,POLYPECTOMY;  Surgeon: Serina Moreno MD;  Location: [facility];  Service: Obstetrics/Gynecology;  Laterality: N/A;    MINI-LAPAROTOMY  2020    subcutaneous endometrioma     Social History     Socioeconomic History    Marital status:    Tobacco Use    Smoking status: Never    Smokeless tobacco: Never   Vaping Use    Vaping status: Never Used   Substance and Sexual Activity    Alcohol use: Never    Drug use: Never    Sexual activity: Yes     Partners: Male     Family History   Problem Relation Age of Onset    Thyroid disease Mother     Diabetes Father     Thyroid disease Sister     Malig Hyperthermia Neg Hx       No Known Allergies   Current Outpatient Medications on File Prior to Visit   Medication Sig Dispense Refill    Prenatal Vit-Fe Fumarate-FA (Prenatal Vitamin) 27-0.8 MG tablet Take 1 tablet by mouth Daily. 90 tablet 4    promethazine (PHENERGAN) 12.5 MG tablet Take 1 tablet by mouth Every 6 (Six) Hours As Needed for Nausea or Vomiting. 30 tablet 0     No current facility-administered medications on file prior to visit.        Past obstetric, gynecological, medical, surgical, family and social history reviewed.  Relevant lab work and imaging  reviewed.    Review of systems  Constitutional:  denies fever, chills, malaise.   ENT/Mouth:  denies sore throat, tinnitus  Eyes: denies vision changes/pain  CV:  denies chest pain  Respiratory:  denies cough/SOB  GI:  denies N/V, diarrhea, abdominal pain.    :   denies dysuria  Skin:  denies lesions or pruritus   Neuro:  denies weakness, focal neurologic symptoms    Vitals:    24 1306   BP: 124/78   BP Location: Right arm   Patient Position: Sitting   Pulse: 103   Temp: 98.2 °F (36.8 °C)   TempSrc: Temporal   Weight: 77.1 kg (170 lb)       PHYSICAL EXAM   GENERAL: Not in acute distress, AAOx3, pleasant  CARDIO: regular rate and rhythm  PULM: symmetric chest rise, speaking in complete sentences without difficulty  NEURO: awake, alert and oriented to person, place, and time  ABDOMINAL: No fundal tenderness, no rebound or guarding, gravid  EXTREMITIES: no bilateral lower extremity edema/tenderness  SKIN: Warm, well-perfused      ULTRASOUND   Please view full ultrasound note on Imaging tab in ViewPoint.  Cephalic presentation  Posterior placenta  MVP 4.7 cm, which is normal   g (AC 52%)  Normal appearing limited anatomy  Transvaginal cervical length is 3.3 cm    ASSESSMENT/COUNSELIN y.o. G 2 P 0101 at 15  6/7 weeks gestation (Estimated Date of Delivery: 24)    -Pregnancy  [ X ] stable  [   ] improving [  ] worsening    Diagnoses and all orders for this visit:    1. Prior 26w delivery for  labor (Primary)    2. History of classical  section    Other orders  -     Progesterone (Prometrium) 200 MG capsule; Insert 1 capsule into the vagina Daily.  Dispense: 90 capsule; Refill: 2         History of  delivery  Hx of delivery at 26 weeks     She had bleeding and went to the hospital, she was 1 cm dilated with bulging bag of water. She was delivered by . This happened about 7 years ago.  She does not know if she had a short cervix or not prior and says she was never told  "this.   We discussed that in this setting I would recommend serial cervical lengths.  We discussed that serial cervical lengths in a questionable hx of cervical incompetence should still detect in most scenarios women who would benefit from a cerclage without doing an unnecessary cerclage in a woman who may not have cervical incompetence.  She understands this.     We will do the following protocol.   If CL ?30 mm, repeat every 2 weeks until 23 6/7 weeks  If CL 25-29 mm, repeat every 1 week until 23  6/7 weeks  If CL <25 mm, offer cerclage placement     Extremely short cervical lengths, ?5 mm, prior to 28 weeks may be associated with a significant enough likelihood of  birth within 2 weeks to warrant inpatient management and steroid administration (mean latency with CL = zero is 3 weeks, 36% risk of delivery within 2 weeks.  The patient was counseled that any history of a prior spontaneous  birth before 37 weeks does come with an inherent risk of recurrence with future pregnancies.  Intervention strategies to help reduce this recurrence risk were reviewed with the patient in great detail.  The current standard of care is to offer weekly 17-Hydroxyprogesterone caproate between 16 and 37 weeks gestation to help prevent a recurrent spontaneous  birth VS vaginal progesterone.       [facility] Statement on 17OHP:               \"In late 2019, results from the Progestin's Role in Optimizing  Gestation (PROLONG) trial were              published showing no benefit of weekly injections of 17-alpha hydroxyprogesterone caproate (17-)HPC)              from 16-20 weeks of gestation in women with a history of a alcala PTB in reducing the rates of              subsequent PTB and  morbidity. The Society for Maternal-Fetal Medicine believes that the              differences in these results from the earlier jose Delvalle trial, which did show a benefit of 17-OHPC in              reducing the rate of " "spontaneous PTB (sPTB), may be at least partially explained by differences in study              populations. [facility] concludes that it is reasonable for providers to use 17-OHPC in women with a profile              more representative of the very-high-risk population reported in the Meis trial. For all women at risk of              recurrent sPTB, the risk/benefit discussion should incorporate a shared decision-making approach,              taking into account the lack of short-term safety concerns but uncertainty regarding benefit.\"     EPPPIC Trial : published in Lancet 2021, evaluated efficacy of 17OHP and vaginal progesterone vs no treatment.  Including 31 trials with data from 11,644 women and 16,185 offspring, the IPD meta-analysis compared vaginal progesterone, injectable 17-alpha hydroxyprogesterone caproate (17-OHPC),and oral progesterone with no treatment, or with each other, in asymptomatic women at risk of  birth (PTB).      Data from the PROLONG trial were included in this meta-analysis. The results showed that compared with those who received no treatment, women with alcala pregnancies at high risk for PTB due to prior spontaneous PTB (sPTB) or short cervix who received 17-OHPC or vaginal progesterone were less likely to deliver before 34 weeks of gestation (17-OHPC five trials, 3053 women; relative risk [RR] 0.83, 95% CI, 0.68-1.01] and vaginal progesterone (nine trials, 3769 women; RR 0.78, 95% CI, 0.68-0.90]).  Due to the lack of a significant interaction between the type of treatment and effect on  birth, the study authors concluded that there was a benefit to both 17-OHPC and vaginal progesterone in reducing the risk of  birth.     She had a normal cervical length today which is reassuring.  We did spend some time talking about the risk of PPROM and PTD in women with hx PTB.     We discussed the strong association of a shortened cervix and the association of  " birth. There is no threshold below which the patient always delivers , but when the cervix is measuring less than 13mm or if the cervix length becomes immeausurable than there is a 75% chance of delivery at less than 32 weeks.     Pt and  declined the use of  services.    UPDATE 24  Would like to check cervical length again in 2 weeks and then see Dr Roach to ask any further cerclage questions they may have.   Pt reports her sister had a cerclage and still delivered at 26 weeks - pt states she had an infection from cerclage placement.  Discussed with pt that Dr Roach will typically admit the day before (unless its an emergent case) and do IV antibiotics prior to cerclage placement and then continue antibiotics after cerclage until course completed.  Pt states she had scant pink discharge that she noticed a few days ago, but she had over exerted herself and was doing more than normal.   Recommend light activity, nothing strenuous, no heavy lifting, avoid being on feet for extended periods of time, etc.   All of their questions were answered. States understanding / denies further questions.        Summary of Plan  -Serial cervical lengths as per above protocol until 24 weeks  -Vaginal progesterone 200 mg QHS until 36 weeks  -Serial growth ultrasounds every 4 weeks (by primary OB)   -Starting at 28 - 32 weeks: Weekly fetal  surveillance until delivery   -Starting at 28 weeks: Fetal movement instructions given continue daily until delivery; instructed to report to labor and delivery if cannot achieve more than 10 kicks in one hour or if she perceives a decrease in fetal movement    Follow-up: 2 weeks cervical length - Pt to see Dr Roach - would like to discuss cerclage procedure    Thank you for the consult and opportunity to care for this patient.  Please feel free to reach out with any questions or concerns.      I spent 45 minutes caring for this patient on this date of  service. This time includes time spent by me in the following activities: preparing for the visit, reviewing tests, obtaining and/or reviewing a separately obtained history, performing a medically appropriate examination and/or evaluation, counseling and educating the patient/family/caregiver and independently interpreting results and communicating that information with the patient/family/caregiver with greater than 50% spent in counseling and coordination of care.     REED Cueva  Maternal Fetal Medicine-[facility]  Office: [phone]  [email or web address]

## 2024-04-02 ENCOUNTER — TRANSCRIBE ORDERS (OUTPATIENT)
Dept: ULTRASOUND IMAGING | Facility: HOSPITAL | Age: 29
End: 2024-04-02
Payer: COMMERCIAL

## 2024-04-02 DIAGNOSIS — Z87.51 HISTORY OF PRETERM LABOR: Primary | ICD-10-CM

## 2024-04-04 ENCOUNTER — ROUTINE PRENATAL (OUTPATIENT)
Dept: OBSTETRICS AND GYNECOLOGY | Facility: CLINIC | Age: 29
End: 2024-04-04
Payer: MEDICAID

## 2024-04-04 VITALS — SYSTOLIC BLOOD PRESSURE: 120 MMHG | BODY MASS INDEX: 28.67 KG/M2 | WEIGHT: 167 LBS | DIASTOLIC BLOOD PRESSURE: 76 MMHG

## 2024-04-04 DIAGNOSIS — Z3A.16 16 WEEKS GESTATION OF PREGNANCY: Primary | ICD-10-CM

## 2024-04-04 DIAGNOSIS — Z98.891 HISTORY OF CLASSICAL CESAREAN SECTION: ICD-10-CM

## 2024-04-04 DIAGNOSIS — O09.219 PRIOR PRETERM LABOR, ANTEPARTUM: ICD-10-CM

## 2024-04-04 LAB
EXPIRATION DATE: ABNORMAL
GLUCOSE UR STRIP-MCNC: NEGATIVE MG/DL
Lab: ABNORMAL
PROT UR STRIP-MCNC: ABNORMAL MG/DL

## 2024-04-04 NOTE — PROGRESS NOTES
Chief Complaint   Patient presents with    Routine Prenatal Visit      Iain Parkinson is a 28 y.o.  at 16w2d  here for routine OB visit  Patient c/o mid back pain down to her buttocks that began yesterday causing pain enough she could not sleep. Recommended to use tylenol and heating pad/warm bottle. She decline tylenol as she is scared of the studies for abnormalities.  She reports today the pain is better; thinks she over did some activities yesterday.  No fever, no dysuria.  No vaginal bleeding.  Has felt quickening    /76   Wt 75.8 kg (167 lb)   LMP 10/26/2023   BMI 28.67 kg/m²    Fetal Heart Rate: 155   Gen: NAD, well appearing  Abd: nontender    See OB Flowsheet    ASSESSMENT/PLAN:  Diagnoses and all orders for this visit:    1. 16 weeks gestation of pregnancy (Primary)  -     POC Urinalysis Dipstick, Automated    2. History of classical  section  -     POC Urinalysis Dipstick, Automated    3. Prior  labor, antepartum  -     POC Urinalysis Dipstick, Automated    Reviewed MFM note. Started vaginal progesterone yesterday and will continue  Serial cervical lengths with MFM  Discussed back pain, likely MSK. Call with any vaginal bleeding, worsening pain, fever, vomiting, dysuria, or other symptoms  Routine counseling pertinent to this stage of pregnancy was reviewed including second trimester precautions  Return in about 4 weeks (around 2024).

## 2024-04-05 ENCOUNTER — HOSPITAL ENCOUNTER (EMERGENCY)
Facility: HOSPITAL | Age: 29
Discharge: HOME | End: 2024-04-05
Attending: STUDENT IN AN ORGANIZED HEALTH CARE EDUCATION/TRAINING PROGRAM
Payer: MEDICAID

## 2024-04-05 VITALS
SYSTOLIC BLOOD PRESSURE: 133 MMHG | BODY MASS INDEX: 28.51 KG/M2 | HEIGHT: 64 IN | DIASTOLIC BLOOD PRESSURE: 79 MMHG | OXYGEN SATURATION: 99 % | HEART RATE: 104 BPM | RESPIRATION RATE: 18 BRPM | WEIGHT: 167 LBS | TEMPERATURE: 97.7 F

## 2024-04-05 DIAGNOSIS — R55 SYNCOPE, UNSPECIFIED SYNCOPE TYPE: Primary | ICD-10-CM

## 2024-04-05 LAB
ALBUMIN SERPL-MCNC: 3.8 G/DL (ref 3.5–5.2)
ALBUMIN/GLOB SERPL: 1.2 G/DL
ALP SERPL-CCNC: 68 U/L (ref 39–117)
ALT SERPL W P-5'-P-CCNC: 25 U/L (ref 1–33)
ANION GAP SERPL CALCULATED.3IONS-SCNC: 11 MMOL/L (ref 5–15)
AST SERPL-CCNC: 17 U/L (ref 1–32)
BACTERIA UR QL AUTO: ABNORMAL /HPF
BASOPHILS # BLD AUTO: 0.03 10*3/MM3 (ref 0–0.2)
BASOPHILS NFR BLD AUTO: 0.3 % (ref 0–1.5)
BILIRUB SERPL-MCNC: <0.2 MG/DL (ref 0–1.2)
BILIRUB UR QL STRIP: NEGATIVE
BUN SERPL-MCNC: 6 MG/DL (ref 6–20)
BUN/CREAT SERPL: 12.8 (ref 7–25)
CALCIUM SPEC-SCNC: 8.7 MG/DL (ref 8.6–10.5)
CHLORIDE SERPL-SCNC: 103 MMOL/L (ref 98–107)
CLARITY UR: ABNORMAL
CO2 SERPL-SCNC: 21 MMOL/L (ref 22–29)
COLOR UR: YELLOW
CREAT SERPL-MCNC: 0.47 MG/DL (ref 0.57–1)
DEPRECATED RDW RBC AUTO: 47 FL (ref 37–54)
EGFRCR SERPLBLD CKD-EPI 2021: 131.5 ML/MIN/1.73
EOSINOPHIL # BLD AUTO: 0.2 10*3/MM3 (ref 0–0.4)
EOSINOPHIL NFR BLD AUTO: 1.8 % (ref 0.3–6.2)
ERYTHROCYTE [DISTWIDTH] IN BLOOD BY AUTOMATED COUNT: 14.5 % (ref 12.3–15.4)
GLOBULIN UR ELPH-MCNC: 3.1 GM/DL
GLUCOSE SERPL-MCNC: 109 MG/DL (ref 65–99)
GLUCOSE UR STRIP-MCNC: ABNORMAL MG/DL
HCT VFR BLD AUTO: 36.9 % (ref 34–46.6)
HGB BLD-MCNC: 12.4 G/DL (ref 12–15.9)
HGB UR QL STRIP.AUTO: NEGATIVE
HOLD SPECIMEN: NORMAL
HOLD SPECIMEN: NORMAL
HYALINE CASTS UR QL AUTO: ABNORMAL /LPF
IMM GRANULOCYTES # BLD AUTO: 0.1 10*3/MM3 (ref 0–0.05)
IMM GRANULOCYTES NFR BLD AUTO: 0.9 % (ref 0–0.5)
KETONES UR QL STRIP: ABNORMAL
LEUKOCYTE ESTERASE UR QL STRIP.AUTO: NEGATIVE
LYMPHOCYTES # BLD AUTO: 2.23 10*3/MM3 (ref 0.7–3.1)
LYMPHOCYTES NFR BLD AUTO: 19.8 % (ref 19.6–45.3)
MAGNESIUM SERPL-MCNC: 1.8 MG/DL (ref 1.6–2.6)
MCH RBC QN AUTO: 29.7 PG (ref 26.6–33)
MCHC RBC AUTO-ENTMCNC: 33.6 G/DL (ref 31.5–35.7)
MCV RBC AUTO: 88.3 FL (ref 79–97)
MONOCYTES # BLD AUTO: 0.7 10*3/MM3 (ref 0.1–0.9)
MONOCYTES NFR BLD AUTO: 6.2 % (ref 5–12)
MUCOUS THREADS URNS QL MICRO: ABNORMAL /HPF
NEUTROPHILS NFR BLD AUTO: 7.99 10*3/MM3 (ref 1.7–7)
NEUTROPHILS NFR BLD AUTO: 71 % (ref 42.7–76)
NITRITE UR QL STRIP: NEGATIVE
NRBC BLD AUTO-RTO: 0 /100 WBC (ref 0–0.2)
PH UR STRIP.AUTO: 8.5 [PH] (ref 5–8)
PLATELET # BLD AUTO: 266 10*3/MM3 (ref 140–450)
PMV BLD AUTO: 9.2 FL (ref 6–12)
POTASSIUM SERPL-SCNC: 3.7 MMOL/L (ref 3.5–5.2)
PROT SERPL-MCNC: 6.9 G/DL (ref 6–8.5)
PROT UR QL STRIP: ABNORMAL
QT INTERVAL: 336 MS
QTC INTERVAL: 449 MS
RBC # BLD AUTO: 4.18 10*6/MM3 (ref 3.77–5.28)
RBC # UR STRIP: ABNORMAL /HPF
REF LAB TEST METHOD: ABNORMAL
SODIUM SERPL-SCNC: 135 MMOL/L (ref 136–145)
SP GR UR STRIP: 1.02 (ref 1–1.03)
SQUAMOUS #/AREA URNS HPF: ABNORMAL /HPF
TROPONIN T SERPL HS-MCNC: <6 NG/L
UROBILINOGEN UR QL STRIP: ABNORMAL
WBC # UR STRIP: ABNORMAL /HPF
WBC NRBC COR # BLD AUTO: 11.25 10*3/MM3 (ref 3.4–10.8)
WHOLE BLOOD HOLD COAG: NORMAL
WHOLE BLOOD HOLD SPECIMEN: NORMAL

## 2024-04-05 PROCEDURE — 93005 ELECTROCARDIOGRAM TRACING: CPT | Performed by: STUDENT IN AN ORGANIZED HEALTH CARE EDUCATION/TRAINING PROGRAM

## 2024-04-05 PROCEDURE — 81001 URINALYSIS AUTO W/SCOPE: CPT | Performed by: STUDENT IN AN ORGANIZED HEALTH CARE EDUCATION/TRAINING PROGRAM

## 2024-04-05 PROCEDURE — 83735 ASSAY OF MAGNESIUM: CPT | Performed by: STUDENT IN AN ORGANIZED HEALTH CARE EDUCATION/TRAINING PROGRAM

## 2024-04-05 PROCEDURE — 80053 COMPREHEN METABOLIC PANEL: CPT | Performed by: STUDENT IN AN ORGANIZED HEALTH CARE EDUCATION/TRAINING PROGRAM

## 2024-04-05 PROCEDURE — 93010 ELECTROCARDIOGRAM REPORT: CPT | Performed by: INTERNAL MEDICINE

## 2024-04-05 PROCEDURE — 85025 COMPLETE CBC W/AUTO DIFF WBC: CPT | Performed by: STUDENT IN AN ORGANIZED HEALTH CARE EDUCATION/TRAINING PROGRAM

## 2024-04-05 PROCEDURE — 99283 EMERGENCY DEPT VISIT LOW MDM: CPT

## 2024-04-05 PROCEDURE — 93005 ELECTROCARDIOGRAM TRACING: CPT

## 2024-04-05 PROCEDURE — 84484 ASSAY OF TROPONIN QUANT: CPT | Performed by: STUDENT IN AN ORGANIZED HEALTH CARE EDUCATION/TRAINING PROGRAM

## 2024-04-05 RX ORDER — SODIUM CHLORIDE 0.9 % (FLUSH) 0.9 %
10 SYRINGE (ML) INJECTION AS NEEDED
Status: DISCONTINUED | OUTPATIENT
Start: 2024-04-05 | End: 2024-04-05 | Stop reason: HOSPADM

## 2024-04-05 NOTE — ED PROVIDER NOTES
EMERGENCY DEPARTMENT ENCOUNTER  Room Number:    PCP: Provider, No Known  Independent Historians: Patient      HPI:  Chief Complaint: had concerns including Syncope.     Context: Iain Parkinson is a 28 y.o. female with a medical history of abnormal uterine bleeding who presents to the ED c/o acute syncope.  Patient reports she was out shopping and began to feel lightheaded.  Patient went up to rub her eyes and the next thing she knew she was on the ground.  There were people standing around her.  Patient is currently 16 weeks gestation.  Patient just started progesterone 2 days ago.  Patient denies chest pain or palpitations.  Patient states she currently feels back to her baseline.      Review of prior external notes (non-ED) -and- Review of prior external test results outside of this encounter: Office visit with obstetrics from yesterday reviewed and notable for 28-year-old G2, P1 at 16 weeks gestation who presented for routine OB visit.  Patient additionally complaining of mid back pain.  Plan at that time was to obtain POC urinalysis with dipstick.  Patient recently started on vaginal progesterone, patient will continue to follow-up with [facility], follow-up in 4 weeks.    PAST MEDICAL HISTORY  Active Ambulatory Problems     Diagnosis Date Noted    Abnormal uterine bleeding (AUB) 10/04/2022    Endometrial polyp 2022    History of classical  section 2024     Resolved Ambulatory Problems     Diagnosis Date Noted    No Resolved Ambulatory Problems     Past Medical History:   Diagnosis Date    Endometriosis     Seasonal allergies     Uterine polyp          PAST SURGICAL HISTORY  Past Surgical History:   Procedure Laterality Date     SECTION      D & C HYSTEROSCOPY N/A 2022    Procedure: DILATATION AND CURETTAGE HYSTEROSCOPY WITH MYOSURE,POLYPECTOMY;  Surgeon: Serina Moreno MD;  Location: [facility];  Service: Obstetrics/Gynecology;  Laterality: N/A;    MINI-LAPAROTOMY  2020     subcutaneous endometrioma         FAMILY HISTORY  Family History   Problem Relation Age of Onset    Thyroid disease Mother     Diabetes Father     Thyroid disease Sister     Malig Hyperthermia Neg Hx          SOCIAL HISTORY  Social History     Socioeconomic History    Marital status:    Tobacco Use    Smoking status: Never    Smokeless tobacco: Never   Vaping Use    Vaping status: Never Used   Substance and Sexual Activity    Alcohol use: Never    Drug use: Never    Sexual activity: Yes     Partners: Male         ALLERGIES  Patient has no known allergies.      REVIEW OF SYSTEMS  Review of Systems  Included in HPI  All systems reviewed and negative except for those discussed in HPI.      PHYSICAL EXAM    I have reviewed the triage vital signs and nursing notes.    ED Triage Vitals   Temp Heart Rate Resp BP SpO2   04/05/24 1432 04/05/24 1429 04/05/24 1429 04/05/24 1429 04/05/24 1429   97.7 °F (36.5 °C) 96 18 124/88 99 %      Temp src Heart Rate Source Patient Position BP Location FiO2 (%)   04/05/24 1432 -- 04/05/24 1443 -- --   Tympanic  Lying         Physical Exam  GENERAL: alert, no acute distress  SKIN: Warm, dry  HENT: Normocephalic, atraumatic  EYES: no scleral icterus  CV: regular rhythm, regular rate  RESPIRATORY: normal effort, lungs clear  ABDOMEN: soft, nontender, nondistended  MUSCULOSKELETAL: no deformity  NEURO: alert, moves all extremities, follows commands      LAB RESULTS  Recent Results (from the past 24 hour(s))   ECG 12 Lead ED Triage Standing Order; Syncope    Collection Time: 04/05/24  2:45 PM   Result Value Ref Range    QT Interval 336 ms    QTC Interval 449 ms   Comprehensive Metabolic Panel    Collection Time: 04/05/24  2:54 PM    Specimen: Blood   Result Value Ref Range    Glucose 109 (H) 65 - 99 mg/dL    BUN 6 6 - 20 mg/dL    Creatinine 0.47 (L) 0.57 - 1.00 mg/dL    Sodium 135 (L) 136 - 145 mmol/L    Potassium 3.7 3.5 - 5.2 mmol/L    Chloride 103 98 - 107 mmol/L    CO2 21.0 (L)  22.0 - 29.0 mmol/L    Calcium 8.7 8.6 - 10.5 mg/dL    Total Protein 6.9 6.0 - 8.5 g/dL    Albumin 3.8 3.5 - 5.2 g/dL    ALT (SGPT) 25 1 - 33 U/L    AST (SGOT) 17 1 - 32 U/L    Alkaline Phosphatase 68 39 - 117 U/L    Total Bilirubin <0.2 0.0 - 1.2 mg/dL    Globulin 3.1 gm/dL    A/G Ratio 1.2 g/dL    BUN/Creatinine Ratio 12.8 7.0 - 25.0    Anion Gap 11.0 5.0 - 15.0 mmol/L    eGFR 131.5 >60.0 mL/min/1.73   Magnesium    Collection Time: 04/05/24  2:54 PM    Specimen: Blood   Result Value Ref Range    Magnesium 1.8 1.6 - 2.6 mg/dL   Single High Sensitivity Troponin T    Collection Time: 04/05/24  2:54 PM    Specimen: Blood   Result Value Ref Range    HS Troponin T <6 <14 ng/L   Green Top (Gel)    Collection Time: 04/05/24  2:54 PM   Result Value Ref Range    Extra Tube Hold for add-ons.    Lavender Top    Collection Time: 04/05/24  2:54 PM   Result Value Ref Range    Extra Tube hold for add-on    Gold Top - SST    Collection Time: 04/05/24  2:54 PM   Result Value Ref Range    Extra Tube Hold for add-ons.    Light Blue Top    Collection Time: 04/05/24  2:54 PM   Result Value Ref Range    Extra Tube Hold for add-ons.    CBC Auto Differential    Collection Time: 04/05/24  2:54 PM    Specimen: Blood   Result Value Ref Range    WBC 11.25 (H) 3.40 - 10.80 10*3/mm3    RBC 4.18 3.77 - 5.28 10*6/mm3    Hemoglobin 12.4 12.0 - 15.9 g/dL    Hematocrit 36.9 34.0 - 46.6 %    MCV 88.3 79.0 - 97.0 fL    MCH 29.7 26.6 - 33.0 pg    MCHC 33.6 31.5 - 35.7 g/dL    RDW 14.5 12.3 - 15.4 %    RDW-SD 47.0 37.0 - 54.0 fl    MPV 9.2 6.0 - 12.0 fL    Platelets 266 140 - 450 10*3/mm3    Neutrophil % 71.0 42.7 - 76.0 %    Lymphocyte % 19.8 19.6 - 45.3 %    Monocyte % 6.2 5.0 - 12.0 %    Eosinophil % 1.8 0.3 - 6.2 %    Basophil % 0.3 0.0 - 1.5 %    Immature Grans % 0.9 (H) 0.0 - 0.5 %    Neutrophils, Absolute 7.99 (H) 1.70 - 7.00 10*3/mm3    Lymphocytes, Absolute 2.23 0.70 - 3.10 10*3/mm3    Monocytes, Absolute 0.70 0.10 - 0.90 10*3/mm3     Eosinophils, Absolute 0.20 0.00 - 0.40 10*3/mm3    Basophils, Absolute 0.03 0.00 - 0.20 10*3/mm3    Immature Grans, Absolute 0.10 (H) 0.00 - 0.05 10*3/mm3    nRBC 0.0 0.0 - 0.2 /100 WBC   Urinalysis With Microscopic If Indicated (No Culture) - Urine, Clean Catch    Collection Time: 04/05/24  3:13 PM    Specimen: Urine, Clean Catch   Result Value Ref Range    Color, UA Yellow Yellow, Straw    Appearance, UA Cloudy (A) Clear    pH, UA 8.5 (H) 5.0 - 8.0    Specific Gravity, UA 1.024 1.005 - 1.030    Glucose,  mg/dL (Trace) (A) Negative    Ketones, UA Trace (A) Negative    Bilirubin, UA Negative Negative    Blood, UA Negative Negative    Protein, UA 30 mg/dL (1+) (A) Negative    Leuk Esterase, UA Negative Negative    Nitrite, UA Negative Negative    Urobilinogen, UA 1.0 E.U./dL 0.2 - 1.0 E.U./dL   Urinalysis, Microscopic Only - Urine, Clean Catch    Collection Time: 04/05/24  3:13 PM    Specimen: Urine, Clean Catch   Result Value Ref Range    RBC, UA 0-2 None Seen, 0-2 /HPF    WBC, UA 3-5 (A) None Seen, 0-2 /HPF    Bacteria, UA 1+ (A) None Seen /HPF    Squamous Epithelial Cells, UA 13-20 (A) None Seen, 0-2 /HPF    Hyaline Casts, UA 0-2 None Seen /LPF    Mucus, UA Trace None Seen, Trace /HPF    Methodology Manual Light Microscopy          RADIOLOGY  No Radiology Exams Resulted Within Past 24 Hours      MEDICATIONS GIVEN IN ER  Medications   sodium chloride 0.9 % flush 10 mL (has no administration in time range)         ORDERS PLACED DURING THIS VISIT:  Orders Placed This Encounter   Procedures    [place] Draw    Comprehensive Metabolic Panel    Magnesium    Single High Sensitivity Troponin T    CBC Auto Differential    Urinalysis With Microscopic If Indicated (No Culture) - Urine, Clean Catch    Urinalysis, Microscopic Only - Urine, Clean Catch    NPO Diet NPO Type: Strict NPO    Undress & Gown    Continuous Pulse Oximetry    Vital Signs    Orthostatic Blood Pressure    OB/GYN (on-call MD unless specified)     Oxygen Therapy- Nasal Cannula; Titrate 1-6 LPM Per SpO2; 90 - 95%    POC Glucose Once    ECG 12 Lead ED Triage Standing Order; Syncope    Insert Peripheral IV    CBC & Differential    Green Top (Gel)    Lavender Top    Gold Top - SST    Light Blue Top         OUTPATIENT MEDICATION MANAGEMENT:  Current Facility-Administered Medications Ordered in Epic   Medication Dose Route Frequency Provider Last Rate Last Admin    sodium chloride 0.9 % flush 10 mL  10 mL Intravenous PRN Augusto Tim MD         Current Outpatient Medications Ordered in Epic   Medication Sig Dispense Refill    Prenatal Vit-Fe Fumarate-FA (Prenatal Vitamin) 27-0.8 MG tablet Take 1 tablet by mouth Daily. 90 tablet 4    Progesterone (Prometrium) 200 MG capsule Insert 1 capsule into the vagina Daily. 90 capsule 2    promethazine (PHENERGAN) 12.5 MG tablet Take 1 tablet by mouth Every 6 (Six) Hours As Needed for Nausea or Vomiting. 30 tablet 0         PROCEDURES  Procedures            PROGRESS, DATA ANALYSIS, CONSULTS, AND MEDICAL DECISION MAKING  All labs have been independently interpreted by me.  All radiology studies have been reviewed by me. All EKG's have been independently viewed and interpreted by me.  Discussion below represents my analysis of pertinent findings related to patient's condition, differential diagnosis, treatment plan and final disposition.    Differential diagnosis includes but is not limited to syncope, seizure, hypovolemia, orthostasis, vasovagal event.    Clinical Scores:                   ED Course as of 04/05/24 1616   Fri Apr 05, 2024   1510 WBC(!): 11.25 [MW]   1519 EKG interpreted by me demonstrates sinus rhythm, rate of 70, no CT/QT prolongation, ST elevation, T wave inversion in lead III [MW]   1600 Bedside POC ultrasound performed and demonstrates good fetal movement and fetal heart rate of 141. [MW]   1611 Discussed with Dr. Trotter or on-call for patient's OB.  He recommends aggressive oral rehydration,  symptomatic monitoring at home and follow-up as needed. [MW]      ED Course User Index  [MW] Augusto Tim MD             AS OF 16:16 EDT VITALS:    BP - 133/79  HR - 104  TEMP - 97.7 °F (36.5 °C) (Tympanic)  O2 SATS - 99%    COMPLEXITY OF CARE  Admission was considered but after careful review of the patient's presentation, physical examination, diagnostic results, and response to treatment the patient may be safely discharged with outpatient follow-up.      DIAGNOSIS  Final diagnoses:   Syncope, unspecified syncope type         DISPOSITION  ED Disposition       ED Disposition   Discharge    Condition   Stable    Comment   --                Please note that portions of this document were completed with a voice recognition program.    Note Disclaimer: At [facility], we believe that sharing information builds trust and better relationships. You are receiving this note because you recently visited [facility]. It is possible you will see health information before a provider has talked with you about it. This kind of information can be easy to misunderstand. To help you fully understand what it means for your health, we urge you to discuss this note with your provider.         Augusto Tim MD  04/05/24 2785

## 2024-04-05 NOTE — DISCHARGE INSTRUCTIONS
Please follow-up with your primary care physician within 1 to 2 weeks as needed    Please follow-up with your OB at your previously scheduled appointment    Please return to the ER with new or worsening symptoms including but not limited to chest pain, shortness of breath, lightheadedness, changes in mental status    Please be sure to drink plenty of fluids throughout the pregnancy.  Your OB recommends at least 8 glasses of water per day.  If you begin to feel lightheaded please be sure to sit down or lower yourself to the ground.

## 2024-04-17 ENCOUNTER — OFFICE VISIT (OUTPATIENT)
Dept: OBSTETRICS AND GYNECOLOGY | Facility: CLINIC | Age: 29
End: 2024-04-17
Payer: COMMERCIAL

## 2024-04-17 ENCOUNTER — HOSPITAL ENCOUNTER (OUTPATIENT)
Dept: ULTRASOUND IMAGING | Facility: HOSPITAL | Age: 29
Discharge: HOME | End: 2024-04-17
Admitting: OBSTETRICS & GYNECOLOGY
Payer: COMMERCIAL

## 2024-04-17 ENCOUNTER — HOSPITAL ENCOUNTER (INPATIENT)
Facility: HOSPITAL | Age: 29
LOS: 1 days | Discharge: HOME | DRG: 819 | End: 2024-04-18
Attending: OBSTETRICS & GYNECOLOGY | Admitting: OBSTETRICS & GYNECOLOGY
Payer: COMMERCIAL

## 2024-04-17 VITALS
TEMPERATURE: 98.4 F | OXYGEN SATURATION: 99 % | SYSTOLIC BLOOD PRESSURE: 120 MMHG | HEIGHT: 64 IN | WEIGHT: 170 LBS | DIASTOLIC BLOOD PRESSURE: 70 MMHG | BODY MASS INDEX: 29.02 KG/M2 | HEART RATE: 121 BPM

## 2024-04-17 DIAGNOSIS — O26.879 SHORT CERVIX AFFECTING PREGNANCY: Primary | ICD-10-CM

## 2024-04-17 DIAGNOSIS — Z87.51 HISTORY OF PRETERM LABOR: ICD-10-CM

## 2024-04-17 LAB
ABO GROUP BLD: NORMAL
BLD GP AB SCN SERPL QL: NEGATIVE
DEPRECATED RDW RBC AUTO: 42.5 FL (ref 37–54)
ERYTHROCYTE [DISTWIDTH] IN BLOOD BY AUTOMATED COUNT: 13.9 % (ref 12.3–15.4)
HCT VFR BLD AUTO: 32.9 % (ref 34–46.6)
HGB BLD-MCNC: 11.1 G/DL (ref 12–15.9)
MCH RBC QN AUTO: 28.7 PG (ref 26.6–33)
MCHC RBC AUTO-ENTMCNC: 33.7 G/DL (ref 31.5–35.7)
MCV RBC AUTO: 85 FL (ref 79–97)
PLATELET # BLD AUTO: 258 10*3/MM3 (ref 140–450)
PMV BLD AUTO: 8.9 FL (ref 6–12)
RBC # BLD AUTO: 3.87 10*6/MM3 (ref 3.77–5.28)
RH BLD: POSITIVE
T PALLIDUM IGG SER QL: NORMAL
T&S EXPIRATION DATE: NORMAL
WBC NRBC COR # BLD AUTO: 11.73 10*3/MM3 (ref 3.4–10.8)

## 2024-04-17 PROCEDURE — 86901 BLOOD TYPING SEROLOGIC RH(D): CPT | Performed by: OBSTETRICS & GYNECOLOGY

## 2024-04-17 PROCEDURE — 86850 RBC ANTIBODY SCREEN: CPT | Performed by: OBSTETRICS & GYNECOLOGY

## 2024-04-17 PROCEDURE — S0260 H&P FOR SURGERY: HCPCS | Performed by: OBSTETRICS & GYNECOLOGY

## 2024-04-17 PROCEDURE — 76817 TRANSVAGINAL US OBSTETRIC: CPT

## 2024-04-17 PROCEDURE — 86780 TREPONEMA PALLIDUM: CPT | Performed by: OBSTETRICS & GYNECOLOGY

## 2024-04-17 PROCEDURE — 86900 BLOOD TYPING SEROLOGIC ABO: CPT | Performed by: OBSTETRICS & GYNECOLOGY

## 2024-04-17 PROCEDURE — 85027 COMPLETE CBC AUTOMATED: CPT | Performed by: OBSTETRICS & GYNECOLOGY

## 2024-04-17 PROCEDURE — 25010000002 CEFAZOLIN PER 500 MG: Performed by: OBSTETRICS & GYNECOLOGY

## 2024-04-17 PROCEDURE — 76815 OB US LIMITED FETUS(S): CPT

## 2024-04-17 RX ORDER — SODIUM CHLORIDE 0.9 % (FLUSH) 0.9 %
10 SYRINGE (ML) INJECTION AS NEEDED
Status: DISCONTINUED | OUTPATIENT
Start: 2024-04-17 | End: 2024-04-18 | Stop reason: HOSPADM

## 2024-04-17 RX ORDER — INDOMETHACIN 50 MG/1
50 CAPSULE ORAL 3 TIMES DAILY
Status: DISCONTINUED | OUTPATIENT
Start: 2024-04-17 | End: 2024-04-17

## 2024-04-17 RX ORDER — ONDANSETRON 2 MG/ML
4 INJECTION INTRAMUSCULAR; INTRAVENOUS EVERY 8 HOURS PRN
Status: DISCONTINUED | OUTPATIENT
Start: 2024-04-17 | End: 2024-04-18

## 2024-04-17 RX ORDER — BISACODYL 10 MG
10 SUPPOSITORY, RECTAL RECTAL DAILY PRN
Status: DISCONTINUED | OUTPATIENT
Start: 2024-04-17 | End: 2024-04-18 | Stop reason: HOSPADM

## 2024-04-17 RX ORDER — DOCUSATE SODIUM 100 MG/1
100 CAPSULE, LIQUID FILLED ORAL 2 TIMES DAILY PRN
Status: DISCONTINUED | OUTPATIENT
Start: 2024-04-17 | End: 2024-04-18

## 2024-04-17 RX ORDER — ACETAMINOPHEN 325 MG/1
650 TABLET ORAL EVERY 4 HOURS PRN
Status: DISCONTINUED | OUTPATIENT
Start: 2024-04-17 | End: 2024-04-18 | Stop reason: HOSPADM

## 2024-04-17 RX ORDER — ONDANSETRON 4 MG/1
8 TABLET, ORALLY DISINTEGRATING ORAL EVERY 8 HOURS PRN
Status: DISCONTINUED | OUTPATIENT
Start: 2024-04-17 | End: 2024-04-18

## 2024-04-17 RX ORDER — SODIUM CHLORIDE 9 MG/ML
40 INJECTION, SOLUTION INTRAVENOUS AS NEEDED
Status: DISCONTINUED | OUTPATIENT
Start: 2024-04-17 | End: 2024-04-18 | Stop reason: HOSPADM

## 2024-04-17 RX ORDER — INDOMETHACIN 50 MG/1
50 CAPSULE ORAL 3 TIMES DAILY
Status: DISCONTINUED | OUTPATIENT
Start: 2024-04-17 | End: 2024-04-18

## 2024-04-17 RX ORDER — LIDOCAINE HYDROCHLORIDE 10 MG/ML
0.5 INJECTION, SOLUTION INFILTRATION; PERINEURAL ONCE AS NEEDED
Status: DISCONTINUED | OUTPATIENT
Start: 2024-04-17 | End: 2024-04-18 | Stop reason: HOSPADM

## 2024-04-17 RX ORDER — CALCIUM CARBONATE 500 MG/1
2 TABLET, CHEWABLE ORAL DAILY PRN
Status: DISCONTINUED | OUTPATIENT
Start: 2024-04-17 | End: 2024-04-18 | Stop reason: HOSPADM

## 2024-04-17 RX ORDER — SODIUM CHLORIDE 0.9 % (FLUSH) 0.9 %
10 SYRINGE (ML) INJECTION EVERY 12 HOURS SCHEDULED
Status: DISCONTINUED | OUTPATIENT
Start: 2024-04-17 | End: 2024-04-18 | Stop reason: HOSPADM

## 2024-04-17 RX ADMIN — SODIUM CHLORIDE 40 ML: 9 INJECTION, SOLUTION INTRAVENOUS at 18:56

## 2024-04-17 RX ADMIN — SODIUM CHLORIDE 2000 MG: 900 INJECTION INTRAVENOUS at 18:54

## 2024-04-17 RX ADMIN — INDOMETHACIN 50 MG: 50 CAPSULE ORAL at 21:13

## 2024-04-17 NOTE — H&P
MATERNAL FETAL MEDICINE CONSULT Note    Dear Dr Rosaline Roach MD:    Thank you for your kind referral of Iain Parkinson.  As you know, she is a 28 y.o. G 2 P 0101 at 18 1/7 weeks gestation (Estimated Date of Delivery: 24). This is a consult.     Her antepartum course is complicated by:  H/O  delivery at 26 weeks    Aneuploidy Screening: MaterniT 21 - Negative    HPI: Today, she denies headache, blurry vision, RUQ pain. No vaginal bleeding, no contractions.     Review of History:  Past Medical History:   Diagnosis Date    Endometriosis     Seasonal allergies     Uterine polyp      Past Surgical History:   Procedure Laterality Date     SECTION      D & C HYSTEROSCOPY N/A 2022    Procedure: DILATATION AND CURETTAGE HYSTEROSCOPY WITH MYOSURE,POLYPECTOMY;  Surgeon: Serina Moreno MD;  Location: [facility];  Service: Obstetrics/Gynecology;  Laterality: N/A;    MINI-LAPAROTOMY  2020    subcutaneous endometrioma     Social History     Socioeconomic History    Marital status:      Spouse name: Jillian   Tobacco Use    Smoking status: Never    Smokeless tobacco: Never   Vaping Use    Vaping status: Never Used   Substance and Sexual Activity    Alcohol use: Never    Drug use: Never    Sexual activity: Yes     Partners: Male     Family History   Problem Relation Age of Onset    Thyroid disease Mother     Diabetes Father     Thyroid disease Sister     Malig Hyperthermia Neg Hx       No Known Allergies   No current facility-administered medications on file prior to encounter.     Current Outpatient Medications on File Prior to Encounter   Medication Sig Dispense Refill    Prenatal Vit-Fe Fumarate-FA (Prenatal Vitamin) 27-0.8 MG tablet Take 1 tablet by mouth Daily. 90 tablet 4    Progesterone (Prometrium) 200 MG capsule Insert 1 capsule into the vagina Daily. 90 capsule 2    promethazine (PHENERGAN) 12.5 MG tablet Take 1 tablet by mouth Every 6 (Six) Hours As Needed for Nausea or Vomiting.  30 tablet 0        Past obstetric, gynecological, medical, surgical, family and social history reviewed.  Relevant lab work and imaging reviewed.    Review of systems  Constitutional:  denies fever, chills, malaise.   ENT/Mouth:  denies sore throat, tinnitus  Eyes: denies vision changes/pain  CV:  denies chest pain  Respiratory:  denies cough/SOB  GI:  denies N/V, diarrhea, abdominal pain.    :   denies dysuria  Skin:  denies lesions or pruritus   Neuro:  denies weakness, focal neurologic symptoms    Vitals:    24 1706 24 1708   BP: 132/80    Pulse: (!) 126    Temp:  98 °F (36.7 °C)   TempSrc:  Oral   SpO2: 99%        PHYSICAL EXAM   GENERAL: Not in acute distress, AAOx3, pleasant  CARDIO: regular rate and rhythm  PULM: symmetric chest rise, speaking in complete sentences without difficulty  NEURO: awake, alert and oriented to person, place, and time  ABDOMINAL: No fundal tenderness, no rebound or guarding, gravid  EXTREMITIES: no bilateral lower extremity edema/tenderness  SKIN: Warm, well-perfused      ULTRASOUND   Please view full ultrasound note on Imaging tab in ViewPoint.  Transverse presentation.  Posterior placenta.  MVP 6.2 cm.  CL 2.4-2.7 cm, significantly shorter than prior.      ASSESSMENT/COUNSELIN y.o. G 2 P 0101 at 18 1/7 weeks gestation (Estimated Date of Delivery: 24)    -Pregnancy  [ X ] stable  [   ] improving [  ] worsening    There are no diagnoses linked to this encounter.       History of  delivery  Hx of delivery at 26 weeks     She had bleeding and went to the hospital, she was 1 cm dilated with bulging bag of water. She was delivered by . This happened about 7 years ago.  She does not know if she had a short cervix or not prior and says she was never told this.   We discussed that in this setting I would recommend serial cervical lengths.  We discussed that serial cervical lengths in a questionable hx of cervical incompetence should still detect in  most scenarios women who would benefit from a cerclage without doing an unnecessary cerclage in a woman who may not have cervical incompetence.  She understands this.     We will do the following protocol.   If CL ?30 mm, repeat every 2 weeks until 23 6/7 weeks  If CL 25-29 mm, repeat every 1 week until 23  6/7 weeks  If CL <25 mm, offer cerclage placement     In my opinion, if there is a pattern of cervical shortening, we will proceed with cerclage.  Her cervix is now 2.4 cm at lowest, so we will plan cerclage tomorrow.      We reviewed risks of cerclage including risk of bleeding, infection, damage to surrounding organs such as bowel/bladder, and PPROM.  She understands doing it now reduces that risk.  She is amenable to doing cerclage tomorrow.  We will admit for IV antibiotics, indocin, and cerclage tomorrow at 7:30.  She and her 's questions were answered.  Discussed success of 80-90 percent term delivery with cerclage placement.   She will go home and get her things and then present to L&D.  L&D notified.      She is concerned vaginal progesterone is causing her palpitations/ tachycardia.  Discussed this is unlikely but will stop after cerclage until it is time for me to see her again.  Her repeat HR when I saw her was 109 bpm, which is improved.  Will get EKG and if significant resistant tachycardia consult cardiology.      Summary of Plan  -Admit to L&D or ante--IV antibiotics, indocin, toco, NPO after MN and cerclage in AM  -[place] x2 hours and if appropriate, d/c and start indocin  -Likely home tomorrow  -Will give cerclage handout in AM.   -Stopping progesterone until post op visit.      Follow-up: 2 weeks post op    Thank you for the consult and opportunity to care for this patient.  Please feel free to reach out with any questions or concerns.      I spent 30 minutes caring for this patient on this date of service. This time includes time spent by me in the following activities: preparing for the  visit, reviewing tests, obtaining and/or reviewing a separately obtained history, performing a medically appropriate examination and/or evaluation, counseling and educating the patient/family/caregiver and independently interpreting results and communicating that information with the patient/family/caregiver with greater than 50% spent in counseling and coordination of care.     4 minutes reading US.      Rosaline Roach MD FACOG  Maternal Fetal Medicine-[facility]  Office: [phone]  [email or web address]

## 2024-04-17 NOTE — PROGRESS NOTES
MATERNAL FETAL MEDICINE CONSULT Note    Dear Dr Serina Moreno MD:    Thank you for your kind referral of Iain Parkinson.  As you know, she is a 28 y.o. G 2 P 0101 at 18 1/7 weeks gestation (Estimated Date of Delivery: 24). This is a consult.     Her antepartum course is complicated by:  H/O  delivery at 26 weeks    Aneuploidy Screening: MaterniT 21 - Negative    HPI: Today, she denies headache, blurry vision, RUQ pain. No vaginal bleeding, no contractions.     Review of History:  Past Medical History:   Diagnosis Date    Endometriosis     Seasonal allergies     Uterine polyp      Past Surgical History:   Procedure Laterality Date     SECTION      D & C HYSTEROSCOPY N/A 2022    Procedure: DILATATION AND CURETTAGE HYSTEROSCOPY WITH MYOSURE,POLYPECTOMY;  Surgeon: Serina Moreno MD;  Location: [facility];  Service: Obstetrics/Gynecology;  Laterality: N/A;    MINI-LAPAROTOMY  2020    subcutaneous endometrioma     Social History     Socioeconomic History    Marital status:    Tobacco Use    Smoking status: Never    Smokeless tobacco: Never   Vaping Use    Vaping status: Never Used   Substance and Sexual Activity    Alcohol use: Never    Drug use: Never    Sexual activity: Yes     Partners: Male     Family History   Problem Relation Age of Onset    Thyroid disease Mother     Diabetes Father     Thyroid disease Sister     Malig Hyperthermia Neg Hx       No Known Allergies   Current Outpatient Medications on File Prior to Visit   Medication Sig Dispense Refill    Prenatal Vit-Fe Fumarate-FA (Prenatal Vitamin) 27-0.8 MG tablet Take 1 tablet by mouth Daily. 90 tablet 4    Progesterone (Prometrium) 200 MG capsule Insert 1 capsule into the vagina Daily. 90 capsule 2    promethazine (PHENERGAN) 12.5 MG tablet Take 1 tablet by mouth Every 6 (Six) Hours As Needed for Nausea or Vomiting. 30 tablet 0     No current facility-administered medications on file prior to visit.        Past  "obstetric, gynecological, medical, surgical, family and social history reviewed.  Relevant lab work and imaging reviewed.    Review of systems  Constitutional:  denies fever, chills, malaise.   ENT/Mouth:  denies sore throat, tinnitus  Eyes: denies vision changes/pain  CV:  denies chest pain  Respiratory:  denies cough/SOB  GI:  denies N/V, diarrhea, abdominal pain.    :   denies dysuria  Skin:  denies lesions or pruritus   Neuro:  denies weakness, focal neurologic symptoms    Vitals:    24 1100 24 1223   BP: 120/70    BP Location: Right arm    Patient Position: Sitting    Pulse: (!) 133 (!) 121   Temp: 98.4 °F (36.9 °C)    TempSrc: Temporal    SpO2: 98% 99%   Weight: 77.1 kg (170 lb)    Height: 162.6 cm (64\")        PHYSICAL EXAM   GENERAL: Not in acute distress, AAOx3, pleasant  CARDIO: regular rate and rhythm  PULM: symmetric chest rise, speaking in complete sentences without difficulty  NEURO: awake, alert and oriented to person, place, and time  ABDOMINAL: No fundal tenderness, no rebound or guarding, gravid  EXTREMITIES: no bilateral lower extremity edema/tenderness  SKIN: Warm, well-perfused      ULTRASOUND   Please view full ultrasound note on Imaging tab in ViewPoint.  Transverse presentation.  Posterior placenta.  MVP 6.2 cm.  CL 2.4-2.7 cm, significantly shorter than prior.      ASSESSMENT/COUNSELIN y.o. G 2 P 0101 at 18 1/7 weeks gestation (Estimated Date of Delivery: 24)    -Pregnancy  [ X ] stable  [   ] improving [  ] worsening    Diagnoses and all orders for this visit:    1. Short cervix affecting pregnancy (Primary)  -     Case Request         History of  delivery  Hx of delivery at 26 weeks     She had bleeding and went to the hospital, she was 1 cm dilated with bulging bag of water. She was delivered by . This happened about 7 years ago.  She does not know if she had a short cervix or not prior and says she was never told this.   We discussed that in this " setting I would recommend serial cervical lengths.  We discussed that serial cervical lengths in a questionable hx of cervical incompetence should still detect in most scenarios women who would benefit from a cerclage without doing an unnecessary cerclage in a woman who may not have cervical incompetence.  She understands this.     We will do the following protocol.   If CL ?30 mm, repeat every 2 weeks until 23 6/7 weeks  If CL 25-29 mm, repeat every 1 week until 23  6/7 weeks  If CL <25 mm, offer cerclage placement     In my opinion, if there is a pattern of cervical shortening, we will proceed with cerclage.  Her cervix is now 2.4 cm at lowest, so we will plan cerclage tomorrow.      We reviewed risks of cerclage including risk of bleeding, infection, damage to surrounding organs such as bowel/bladder, and PPROM.  She understands doing it now reduces that risk.  She is amenable to doing cerclage tomorrow.  We will admit for IV antibiotics, indocin, and cerclage tomorrow at 7:30.  She and her 's questions were answered.  Discussed success of 80-90 percent term delivery with cerclage placement.   She will go home and get her things and then present to L&D.  L&D notified.      She is concerned vaginal progesterone is causing her palpitations/ tachycardia.  Discussed this is unlikely but will stop after cerclage until it is time for me to see her again.  Her repeat HR when I saw her was 109 bpm, which is improved.  Will get EKG and if significant resistant tachycardia consult cardiology.      Summary of Plan  -Admit to L&D or ante--IV antibiotics, indocin, toco, NPO after MN and cerclage in AM  -[place] x2 hours and if appropriate, d/c and start indocin  -Likely home tomorrow  -Will give cerclage handout in AM.   -Stopping progesterone until post op visit.      Follow-up: 2 weeks post op    Thank you for the consult and opportunity to care for this patient.  Please feel free to reach out with any questions or  concerns.      I spent 30 minutes caring for this patient on this date of service. This time includes time spent by me in the following activities: preparing for the visit, reviewing tests, obtaining and/or reviewing a separately obtained history, performing a medically appropriate examination and/or evaluation, counseling and educating the patient/family/caregiver and independently interpreting results and communicating that information with the patient/family/caregiver with greater than 50% spent in counseling and coordination of care.     4 minutes reading US.      Rosaline Roach MD FACOG  Maternal Fetal Medicine-[facility]  Office: [phone]  [email or web address]

## 2024-04-17 NOTE — PROGRESS NOTES
Pt reports that she is doing well and denies vaginal bleeding, cramping, contractions or LOF at this time. Reports active fetal movement. Reviewed when to call OB office or present to L&D for evaluation with symptoms such as decreased fetal movement, vaginal bleeding, LOF or ctxs. Pt verbalized understanding. Denies HA, visual changes or epigastric pain. Patient stated that she passed out on 04/04/2024 and went to the ED. She states that she has been dizzy almost everyday when walking. Nurse was notified came in to ask further questions and MFM Dr. Roach was notified. Denies any additional complaints at time of appointment. Next OB appointment scheduled for 05/01/2024    Vitals:    04/17/24 1223   BP:    Pulse: (!) 121   Temp:    SpO2: 99%

## 2024-04-17 NOTE — LETTER
2024     Serina Moreno MD  [address]  [address]  [facility] [address]    Patient: Iain Parkinson   YOB: 1995   Date of Visit: 2024       Dear Serina Moreno MD,    Thank you for referring Iain Parkinson to me for evaluation. Below is a copy of my consult note.    If you have questions, please do not hesitate to call me. I look forward to following Iain along with you.         Sincerely,        Rosaline Roach MD    MATERNAL FETAL MEDICINE CONSULT Note    Dear Dr Serina Moreno MD:    Thank you for your kind referral of Iain Parkinson.  As you know, she is a 28 y.o. G 2 P 0101 at 18 1/7 weeks gestation (Estimated Date of Delivery: 24). This is a consult.     Her antepartum course is complicated by:  H/O  delivery at 26 weeks    Aneuploidy Screening: MaterniT 21 - Negative    HPI: Today, she denies headache, blurry vision, RUQ pain. No vaginal bleeding, no contractions.     Review of History:  Past Medical History:   Diagnosis Date   • Endometriosis    • Seasonal allergies    • Uterine polyp      Past Surgical History:   Procedure Laterality Date   •  SECTION     • D & C HYSTEROSCOPY N/A 2022    Procedure: DILATATION AND CURETTAGE HYSTEROSCOPY WITH MYOSURE,POLYPECTOMY;  Surgeon: Serina Moreno MD;  Location: [facility];  Service: Obstetrics/Gynecology;  Laterality: N/A;   • MINI-LAPAROTOMY  2020    subcutaneous endometrioma     Social History     Socioeconomic History   • Marital status:    Tobacco Use   • Smoking status: Never   • Smokeless tobacco: Never   Vaping Use   • Vaping status: Never Used   Substance and Sexual Activity   • Alcohol use: Never   • Drug use: Never   • Sexual activity: Yes     Partners: Male     Family History   Problem Relation Age of Onset   • Thyroid disease Mother    • Diabetes Father    • Thyroid disease Sister    • Malig Hyperthermia Neg Hx       No Known Allergies   Current Outpatient Medications on File Prior  "to Visit   Medication Sig Dispense Refill   • Prenatal Vit-Fe Fumarate-FA (Prenatal Vitamin) 27-0.8 MG tablet Take 1 tablet by mouth Daily. 90 tablet 4   • Progesterone (Prometrium) 200 MG capsule Insert 1 capsule into the vagina Daily. 90 capsule 2   • promethazine (PHENERGAN) 12.5 MG tablet Take 1 tablet by mouth Every 6 (Six) Hours As Needed for Nausea or Vomiting. 30 tablet 0     No current facility-administered medications on file prior to visit.        Past obstetric, gynecological, medical, surgical, family and social history reviewed.  Relevant lab work and imaging reviewed.    Review of systems  Constitutional:  denies fever, chills, malaise.   ENT/Mouth:  denies sore throat, tinnitus  Eyes: denies vision changes/pain  CV:  denies chest pain  Respiratory:  denies cough/SOB  GI:  denies N/V, diarrhea, abdominal pain.    :   denies dysuria  Skin:  denies lesions or pruritus   Neuro:  denies weakness, focal neurologic symptoms    Vitals:    24 1100 24 1223   BP: 120/70    BP Location: Right arm    Patient Position: Sitting    Pulse: (!) 133 (!) 121   Temp: 98.4 °F (36.9 °C)    TempSrc: Temporal    SpO2: 98% 99%   Weight: 77.1 kg (170 lb)    Height: 162.6 cm (64\")        PHYSICAL EXAM   GENERAL: Not in acute distress, AAOx3, pleasant  CARDIO: regular rate and rhythm  PULM: symmetric chest rise, speaking in complete sentences without difficulty  NEURO: awake, alert and oriented to person, place, and time  ABDOMINAL: No fundal tenderness, no rebound or guarding, gravid  EXTREMITIES: no bilateral lower extremity edema/tenderness  SKIN: Warm, well-perfused      ULTRASOUND   Please view full ultrasound note on Imaging tab in ViewPoint.  Cephalic presentation  Posterior placenta  MVP 4.7 cm, which is normal   g (AC 52%)  Normal appearing limited anatomy  Transvaginal cervical length is 3.3 cm    ASSESSMENT/COUNSELIN y.o. G 2 P 0101 at 18 1/7 weeks gestation (Estimated Date of Delivery: " 24)    -Pregnancy  [ X ] stable  [   ] improving [  ] worsening    Diagnoses and all orders for this visit:    1. Short cervix affecting pregnancy (Primary)  -     Case Request         History of  delivery  Hx of delivery at 26 weeks     She had bleeding and went to the hospital, she was 1 cm dilated with bulging bag of water. She was delivered by . This happened about 7 years ago.  She does not know if she had a short cervix or not prior and says she was never told this.   We discussed that in this setting I would recommend serial cervical lengths.  We discussed that serial cervical lengths in a questionable hx of cervical incompetence should still detect in most scenarios women who would benefit from a cerclage without doing an unnecessary cerclage in a woman who may not have cervical incompetence.  She understands this.     We will do the following protocol.   If CL =30 mm, repeat every 2 weeks until 23 6/7 weeks  If CL 25-29 mm, repeat every 1 week until 23  6/7 weeks  If CL <25 mm, offer cerclage placement     In my opinion, if there is a pattern of cervical shortening, we will proceed with cerclage.  Her cervix is now 2.4 cm at lowest, so we will plan cerclage tomorrow.      We reviewed risks of cerclage including risk of bleeding, infection, damage to surrounding organs such as bowel/bladder, and PPROM.  She understands doing it now reduces that risk.  She is amenable to doing cerclage tomorrow.  We will admit for IV antibiotics, indocin, and cerclage tomorrow at 7:30.  She and her 's questions were answered.  Discussed success of 80-90 percent term delivery with cerclage placement.   She will go home and get her things and then present to L&D.  L&D notified.      She is concerned vaginal progesterone is causing her palpitations/ tachycardia.  Discussed this is unlikely but will stop after cerclage until it is time for me to see her again.  Her repeat HR when I saw her was 109 bpm,  which is improved.  Will get EKG and if significant resistant tachycardia consult cardiology.      Summary of Plan  -Admit to L&D or ante--IV antibiotics, indocin, toco, NPO after MN and cerclage in AM  -[place] x2 hours and if appropriate, d/c and start indocin  -Likely home tomorrow  -Will give cerclage handout in AM.   -Stopping progesterone until post op visit.      Follow-up: 2 weeks post op    Thank you for the consult and opportunity to care for this patient.  Please feel free to reach out with any questions or concerns.      I spent 30 minutes caring for this patient on this date of service. This time includes time spent by me in the following activities: preparing for the visit, reviewing tests, obtaining and/or reviewing a separately obtained history, performing a medically appropriate examination and/or evaluation, counseling and educating the patient/family/caregiver and independently interpreting results and communicating that information with the patient/family/caregiver with greater than 50% spent in counseling and coordination of care.     4 minutes reading US.      Rosaline Roach MD FACOG  Maternal Fetal Medicine-[facility]  Office: [phone]  [email or web address]

## 2024-04-18 ENCOUNTER — ANESTHESIA (OUTPATIENT)
Dept: PERIOP | Facility: HOSPITAL | Age: 29
End: 2024-04-18
Payer: COMMERCIAL

## 2024-04-18 ENCOUNTER — ANESTHESIA EVENT (OUTPATIENT)
Dept: PERIOP | Facility: HOSPITAL | Age: 29
End: 2024-04-18
Payer: COMMERCIAL

## 2024-04-18 VITALS
HEART RATE: 107 BPM | SYSTOLIC BLOOD PRESSURE: 113 MMHG | OXYGEN SATURATION: 99 % | TEMPERATURE: 98.1 F | RESPIRATION RATE: 16 BRPM | DIASTOLIC BLOOD PRESSURE: 66 MMHG

## 2024-04-18 PROCEDURE — 25010000002 CEFAZOLIN PER 500 MG: Performed by: OBSTETRICS & GYNECOLOGY

## 2024-04-18 PROCEDURE — 0UVC7ZZ RESTRICTION OF CERVIX, VIA NATURAL OR ARTIFICIAL OPENING: ICD-10-PCS | Performed by: OBSTETRICS & GYNECOLOGY

## 2024-04-18 PROCEDURE — 25810000003 LACTATED RINGERS PER 1000 ML: Performed by: ANESTHESIOLOGY

## 2024-04-18 PROCEDURE — 59320 REVISION OF CERVIX: CPT | Performed by: OBSTETRICS & GYNECOLOGY

## 2024-04-18 PROCEDURE — 25010000002 BUPIVACAINE PF 0.75 % SOLUTION: Performed by: ANESTHESIOLOGY

## 2024-04-18 RX ORDER — ACETAMINOPHEN 500 MG
1000 TABLET ORAL ONCE
Status: COMPLETED | OUTPATIENT
Start: 2024-04-18 | End: 2024-04-18

## 2024-04-18 RX ORDER — SODIUM CHLORIDE 0.9 % (FLUSH) 0.9 %
3 SYRINGE (ML) INJECTION EVERY 12 HOURS SCHEDULED
Status: DISCONTINUED | OUTPATIENT
Start: 2024-04-18 | End: 2024-04-18 | Stop reason: HOSPADM

## 2024-04-18 RX ORDER — DROPERIDOL 2.5 MG/ML
0.62 INJECTION, SOLUTION INTRAMUSCULAR; INTRAVENOUS
Status: DISCONTINUED | OUTPATIENT
Start: 2024-04-18 | End: 2024-04-18 | Stop reason: HOSPADM

## 2024-04-18 RX ORDER — DIPHENHYDRAMINE HCL 25 MG
25 CAPSULE ORAL EVERY 4 HOURS PRN
Status: DISCONTINUED | OUTPATIENT
Start: 2024-04-18 | End: 2024-04-18 | Stop reason: HOSPADM

## 2024-04-18 RX ORDER — DOCUSATE SODIUM 100 MG/1
100 CAPSULE, LIQUID FILLED ORAL 2 TIMES DAILY PRN
Status: DISCONTINUED | OUTPATIENT
Start: 2024-04-18 | End: 2024-04-18 | Stop reason: HOSPADM

## 2024-04-18 RX ORDER — ONDANSETRON 2 MG/ML
4 INJECTION INTRAMUSCULAR; INTRAVENOUS EVERY 8 HOURS PRN
Status: DISCONTINUED | OUTPATIENT
Start: 2024-04-18 | End: 2024-04-18 | Stop reason: HOSPADM

## 2024-04-18 RX ORDER — ONDANSETRON 4 MG/1
8 TABLET, ORALLY DISINTEGRATING ORAL EVERY 8 HOURS PRN
Status: DISCONTINUED | OUTPATIENT
Start: 2024-04-18 | End: 2024-04-18 | Stop reason: HOSPADM

## 2024-04-18 RX ORDER — INDOMETHACIN 50 MG/1
50 CAPSULE ORAL 3 TIMES DAILY
Qty: 6 CAPSULE | Refills: 0 | Status: SHIPPED | OUTPATIENT
Start: 2024-04-18

## 2024-04-18 RX ORDER — SODIUM CHLORIDE 0.9 % (FLUSH) 0.9 %
3-10 SYRINGE (ML) INJECTION AS NEEDED
Status: DISCONTINUED | OUTPATIENT
Start: 2024-04-18 | End: 2024-04-18 | Stop reason: HOSPADM

## 2024-04-18 RX ORDER — HYDROMORPHONE HYDROCHLORIDE 1 MG/ML
0.5 INJECTION, SOLUTION INTRAMUSCULAR; INTRAVENOUS; SUBCUTANEOUS
Status: DISCONTINUED | OUTPATIENT
Start: 2024-04-18 | End: 2024-04-18 | Stop reason: HOSPADM

## 2024-04-18 RX ORDER — BUPIVACAINE HYDROCHLORIDE 7.5 MG/ML
INJECTION, SOLUTION EPIDURAL; RETROBULBAR
Status: COMPLETED | OUTPATIENT
Start: 2024-04-18 | End: 2024-04-18

## 2024-04-18 RX ORDER — DOCUSATE SODIUM 100 MG/1
100 CAPSULE, LIQUID FILLED ORAL 2 TIMES DAILY
Qty: 60 CAPSULE | Refills: 1 | Status: SHIPPED | OUTPATIENT
Start: 2024-04-18

## 2024-04-18 RX ORDER — NALOXONE HCL 0.4 MG/ML
0.2 VIAL (ML) INJECTION
Status: DISCONTINUED | OUTPATIENT
Start: 2024-04-18 | End: 2024-04-18 | Stop reason: HOSPADM

## 2024-04-18 RX ORDER — SODIUM CHLORIDE, SODIUM LACTATE, POTASSIUM CHLORIDE, CALCIUM CHLORIDE 600; 310; 30; 20 MG/100ML; MG/100ML; MG/100ML; MG/100ML
9 INJECTION, SOLUTION INTRAVENOUS CONTINUOUS
Status: DISCONTINUED | OUTPATIENT
Start: 2024-04-18 | End: 2024-04-18 | Stop reason: HOSPADM

## 2024-04-18 RX ORDER — INDOMETHACIN 50 MG/1
50 CAPSULE ORAL 3 TIMES DAILY
Status: DISCONTINUED | OUTPATIENT
Start: 2024-04-18 | End: 2024-04-18 | Stop reason: HOSPADM

## 2024-04-18 RX ORDER — ONDANSETRON 2 MG/ML
4 INJECTION INTRAMUSCULAR; INTRAVENOUS ONCE AS NEEDED
Status: DISCONTINUED | OUTPATIENT
Start: 2024-04-18 | End: 2024-04-18 | Stop reason: HOSPADM

## 2024-04-18 RX ORDER — DIPHENHYDRAMINE HYDROCHLORIDE 50 MG/ML
25 INJECTION INTRAMUSCULAR; INTRAVENOUS EVERY 4 HOURS PRN
Status: DISCONTINUED | OUTPATIENT
Start: 2024-04-18 | End: 2024-04-18 | Stop reason: HOSPADM

## 2024-04-18 RX ORDER — MORPHINE SULFATE 2 MG/ML
2 INJECTION, SOLUTION INTRAMUSCULAR; INTRAVENOUS
Status: ACTIVE | OUTPATIENT
Start: 2024-04-18 | End: 2024-04-18

## 2024-04-18 RX ORDER — AZITHROMYCIN 250 MG/1
TABLET, FILM COATED ORAL
Qty: 6 TABLET | Refills: 0 | Status: SHIPPED | OUTPATIENT
Start: 2024-04-18

## 2024-04-18 RX ADMIN — BUPIVACAINE HYDROCHLORIDE 1.2 ML: 7.5 INJECTION, SOLUTION EPIDURAL; RETROBULBAR at 07:48

## 2024-04-18 RX ADMIN — SODIUM CHLORIDE 2000 MG: 900 INJECTION INTRAVENOUS at 02:50

## 2024-04-18 RX ADMIN — ACETAMINOPHEN 1000 MG: 500 TABLET ORAL at 07:22

## 2024-04-18 RX ADMIN — SODIUM CHLORIDE 2000 MG: 900 INJECTION INTRAVENOUS at 10:29

## 2024-04-18 RX ADMIN — SODIUM CHLORIDE, POTASSIUM CHLORIDE, SODIUM LACTATE AND CALCIUM CHLORIDE 9 ML/HR: 600; 310; 30; 20 INJECTION, SOLUTION INTRAVENOUS at 07:02

## 2024-04-18 RX ADMIN — SODIUM CHLORIDE 2000 MG: 900 INJECTION INTRAVENOUS at 17:38

## 2024-04-18 RX ADMIN — INDOMETHACIN 50 MG: 50 CAPSULE ORAL at 10:28

## 2024-04-18 RX ADMIN — SODIUM CHLORIDE 2 G: 900 INJECTION INTRAVENOUS at 07:44

## 2024-04-18 RX ADMIN — INDOMETHACIN 50 MG: 50 CAPSULE ORAL at 17:39

## 2024-04-18 RX ADMIN — Medication 10 ML: at 03:29

## 2024-04-18 NOTE — ANESTHESIA POSTPROCEDURE EVALUATION
Patient: Iain Parkinson    Procedure Summary       Date: 04/18/24 Room / Location: [facility] OR [address] [place] [address] MAIN OR    Anesthesia Start: 0734 Anesthesia Stop: 0848    Procedure: CERVICAL CERCLAGE (Cervix) Diagnosis:       Short cervix affecting pregnancy      (Short cervix affecting pregnancy [O26.879])    Surgeons: Rosaline Roach MD Provider: Jhon Melendez MD    Anesthesia Type: spinal ASA Status: 2            Anesthesia Type: spinal    Vitals  Vitals Value Taken Time   /73 04/18/24 0930   Temp 37.1 °C (98.8 °F) 04/18/24 0840   Pulse 94 04/18/24 0935   Resp 18 04/18/24 0915   SpO2 100 % 04/18/24 0935   Vitals shown include unfiled device data.        Post Anesthesia Care and Evaluation    Patient location during evaluation: PACU  Patient participation: complete - patient participated  Level of consciousness: awake and alert  Pain management: adequate    Airway patency: patent  Anesthetic complications: No anesthetic complications  PONV Status: none  Cardiovascular status: acceptable and hemodynamically stable  Respiratory status: acceptable, nonlabored ventilation and spontaneous ventilation  Hydration status: acceptable

## 2024-04-18 NOTE — PLAN OF CARE
Problem: Adult Inpatient Plan of Care  Goal: Plan of Care Review  Outcome: Ongoing, Progressing  Goal: Patient-Specific Goal (Individualized)  Outcome: Ongoing, Progressing  Goal: Absence of Hospital-Acquired Illness or Injury  Outcome: Ongoing, Progressing  Intervention: Identify and Manage Fall Risk  Intervention: Prevent and Manage VTE (Venous Thromboembolism) Risk  Goal: Optimal Comfort and Wellbeing  Outcome: Ongoing, Progressing  Intervention: Provide Person-Centered Care  Goal: Readiness for Transition of Care  Outcome: Ongoing, Progressing     Problem: Maternal-Fetal Wellbeing  Goal: Optimal Maternal-Fetal Wellbeing  Outcome: Ongoing, Progressing  Intervention: Promote and Monitor Maternal-Fetal Wellbeing  Intervention: Support Psychosocial Response to Complications During Pregnancy   Goal Outcome Evaluation:  Plan of Care Reviewed With: patient, spouse        Progress: no change  Outcome Evaluation: VSS. Denies ctx. Fhr doppler 145-150. Pt to go down for cerclage. Consents signed, jewelry removed.

## 2024-04-18 NOTE — PLAN OF CARE
Goal Outcome Evaluation:  Plan of Care Reviewed With: patient        Progress: improving  Outcome Evaluation: VSS, Patient had cerclage placed this am, has ambulated and voided, tolerating PO, awaiting final dose of antibiotics for discharge

## 2024-04-18 NOTE — OP NOTE
OPERATIVE NOTE     PRE-OP DIAGNOSIS: History of 26 week delivery with progressively shortening cervix.       POST-OP DIAGNOSIS: same, s/p Ultrasound indicated cerclage     PROCEDURE: BAR CERVICAL CERCLAGE     Surgeon: CARLY PRIETO MD     Assistant:Pili Reynoso RN     Anesthesia: SPINAL     EBL:  10 CC, MINIMAL        INDICATION: Patient is a 29 yo  @ 18 w2d with history of 26 week delivery with shortening cervix.       INFORMED CONSENT -   Risks, benefits, and alternatives to cervical cerclage discussed.   Risks of cerclage included but not limited to - pain, bleeding, damage to cervix, rupture of membranes, infection, need for removal of cerclage, failure of cerclage, damage to bowel/bladder. Benefits include advancing gestational age at time of delivery, or reduction in  birth with delivery at term. The alternative would be expectant management and likely previable/periviable delivery.       PROCEDURE:   Patient was taken to the OR after informed consent obtained and prepped and draped in the usual sterile fashion. Time out was performed and a spinal was placed without complication.  She  was placed in dorsal lithotomy position in boot stirrups.   Schmidt catheter was placed in a sterile fashion to drain bladder, removed, and vagina was irrigated with sterile betadine. Weighted speculum and smith retractor were placed and provided good visualization of the cervix.  The cervix appeared soft and friable but closed.  Sponge stick clamps were used to grasp the cervix at 12 o'clock and 6 o'clock to provide traction for the procedure.      A 5mm mersilene tape was placed in a circumferential, purse string fashion without complication with good visualization of the bladder, vaginal side walls, and the rectum.  A stitch was placed from 12 o'clock to 9 o'clock, from 9 o'clock to 7 o'clock, from 7 o'clock to 4 o'clock, from 4 o'clock to 2 o'clock, and from 2 o'clock to 12 o'clock.   Cervical  cerclage was tied down at 12 o'clock position and suture was cut with an approximate 1cm tail.   Cervix was noted to be hemostatic without evidence of rupture of membrane.    Rectal exam was negative for suture.  Patient was cleaned and taken back to the recovery room in stable condition.       My assistant was responsible for retracting and providing good visualization of operative field throughout procedure.       Fetal heart tones were normal in recovery.  Fetus was active and cervix appeared long from above.   Membranes appeared intact on US.       Rosaline Roach MD FACOG  Maternal Fetal Medicine-[facility]  Office: [phone]  [email or web address]

## 2024-04-18 NOTE — ANESTHESIA PREPROCEDURE EVALUATION
Anesthesia Evaluation     no history of anesthetic complications:   NPO Solid Status: > 6 hours  NPO Liquid Status: > 2 hours           Airway   Mallampati: II  Neck ROM: full  no difficulty expected  Dental - normal exam     Pulmonary - negative pulmonary ROS and normal exam   (-) COPD, asthma, sleep apnea, not a smoker    PE comment: nonlabored  Cardiovascular - negative cardio ROS and normal exam  Exercise tolerance: good (4-7 METS)    Rhythm: regular  Rate: normal    (-) hypertension, valvular problems/murmurs, past MI, CAD, dysrhythmias, angina      Neuro/Psych- negative ROS  (-) seizures, TIA, CVA  GI/Hepatic/Renal/Endo - negative ROS   (-) GERD, liver disease, no renal disease, diabetes, no thyroid disorder    Musculoskeletal (-) negative ROS    Abdominal    Substance History      OB/GYN    (+) Pregnant    Comment: Short cervix affecting pregnancy;  Endometriosis;  Endometrial polyp        Other                      Anesthesia Plan    ASA 2     spinal       Anesthetic plan, risks, benefits, and alternatives have been provided, discussed and informed consent has been obtained with: patient and spouse/significant other.      CODE STATUS:    Level Of Support Discussed With: Patient  Code Status (Patient has no pulse and is not breathing): CPR (Attempt to Resuscitate)  Medical Interventions (Patient has pulse or is breathing): Full Support

## 2024-04-18 NOTE — DISCHARGE SUMMARY
Admission date: 2024  Discharge date: 24    Referring Provider: Rosaline Roach MD    Admission diagnosis:  Short cervix affecting pregnancy [O26.879]    Patient Active Problem List   Diagnosis    Abnormal uterine bleeding (AUB)    Endometrial polyp    History of classical  section    Short cervix affecting pregnancy       Discharge diagnosis:  Short cervix s/p cerclage    Consultants:  anesthesia    Hospital course:  Patient is a 29 yo  @ 18 w2d with history of 26 week delivery with shortening cervix.  She was admitted for 24 hours of IV antibiotics and indocin.  She underwent uncomplicated cerclage.  Heart tones were normal afterwards and patient was discharged after 24 hours of IV antibiotics in stable condition.        Vitals:    24 0915   BP: 107/66   Pulse: 89   Resp: 18   Temp:    SpO2: 100%       GENERAL:  Well-developed, well-nourished in no acute distress.   ABD:  Gravid  EXTREMITIES:  No clubbing, cyanosis or edema.  PSYCHIATRIC:  Normal affect and mood.      Discharge condition: stable  Discharge diet   Dietary Orders (From admission, onward)       Start     Ordered    24  Diet: Regular/House; Fluid Consistency: Thin (IDDSI 0)  Diet Effective Now        References:    Diet Order Crosswalk   Question Answer Comment   Diets: Regular/House    Fluid Consistency: Thin (IDDSI 0)        24 0931                  Additional Instructions: Call with fevers, uncontrolled nausea/vomiting/pain.  Medications:      Discharge Medications        ASK your doctor about these medications        Instructions Start Date   azithromycin 250 MG tablet  Commonly known as: Zithromax Z-José   Take 2 tablets (500 mg) by mouth on Day 1, followed by 1 tablet (250 mg) once daily on Days 2 through 5.      docusate sodium 100 MG capsule  Commonly known as: Colace   100 mg, Oral, 2 Times Daily      indomethacin 50 MG capsule  Commonly known as: INDOCIN   50 mg, Oral, 3 times daily       Prenatal Vitamin 27-0.8 MG tablet   1 tablet, Oral, Daily      Progesterone 200 MG capsule  Commonly known as: Prometrium   200 mg, Vaginal, Daily      promethazine 12.5 MG tablet  Commonly known as: PHENERGAN   12.5 mg, Oral, Every 6 Hours PRN             Disposition:Home or Self Care  Follow up:   Future Appointments   Date Time Provider Department Center   4/30/2024  3:00 PM JOSEFINA B[facility]ARDINO US 1 [facility] US [facility]   4/30/2024  3:00 PM Rosaline Roach MD [facility]   5/1/2024  1:20 PM US [facility] [place] [facility]   5/1/2024  2:00 PM Serina Moreno MD [facility]       Over 30 minutes on discharge.  Counseling and coordinating care.    Rosaline Roach MD

## 2024-04-18 NOTE — ANESTHESIA PROCEDURE NOTES
Spinal Block      Patient reassessed immediately prior to procedure    Patient location during procedure: OR  Performed By  Anesthesiologist: Jhon Melendez MD  CRNA/CAA: Jose Rojas CRNA  Preanesthetic Checklist  Completed: patient identified, IV checked, site marked, risks and benefits discussed, surgical consent, monitors and equipment checked, pre-op evaluation and timeout performed  Spinal Block Prep:  Sterile Tech:cap, gloves and sterile barriers  Patient Monitoring:EKG, continuous pulse oximetry and blood pressure monitoring    Spinal Block Procedure  Approach:midline  Guidance:landmark technique and palpation technique  Location:L3-L4  Needle Type:Olesya  Needle Gauge:25 G  Placement of Spinal needle event:cerebrospinal fluid aspirated  Paresthesia: no  Fluid Appearance:clear  Medications: bupivacaine PF (MARCAINE) 0.75 % injection - Spinal   1.2 mL - 4/18/2024 7:48:00 AM   Post Assessment  Patient Tolerance:patient tolerated the procedure well with no apparent complications  Complications no

## 2024-04-23 ENCOUNTER — TRANSCRIBE ORDERS (OUTPATIENT)
Dept: ULTRASOUND IMAGING | Facility: HOSPITAL | Age: 29
End: 2024-04-23
Payer: COMMERCIAL

## 2024-04-23 DIAGNOSIS — Z87.51 HISTORY OF PRETERM LABOR: Primary | ICD-10-CM

## 2024-04-25 ENCOUNTER — HOSPITAL ENCOUNTER (EMERGENCY)
Facility: HOSPITAL | Age: 29
Discharge: HOME | End: 2024-04-25
Attending: OBSTETRICS & GYNECOLOGY | Admitting: OBSTETRICS & GYNECOLOGY
Payer: COMMERCIAL

## 2024-04-25 ENCOUNTER — TELEPHONE (OUTPATIENT)
Dept: ULTRASOUND IMAGING | Facility: HOSPITAL | Age: 29
End: 2024-04-25
Payer: COMMERCIAL

## 2024-04-25 VITALS
BODY MASS INDEX: 29.18 KG/M2 | SYSTOLIC BLOOD PRESSURE: 125 MMHG | OXYGEN SATURATION: 99 % | TEMPERATURE: 99 F | HEIGHT: 64 IN | RESPIRATION RATE: 16 BRPM | HEART RATE: 102 BPM | DIASTOLIC BLOOD PRESSURE: 78 MMHG

## 2024-04-25 LAB
BILIRUB UR QL STRIP: NEGATIVE
CLARITY UR: CLEAR
COLOR UR: YELLOW
GLUCOSE UR STRIP-MCNC: NEGATIVE MG/DL
HGB UR QL STRIP.AUTO: NEGATIVE
KETONES UR QL STRIP: NEGATIVE
LEUKOCYTE ESTERASE UR QL STRIP.AUTO: NEGATIVE
NITRITE UR QL STRIP: NEGATIVE
PH UR STRIP.AUTO: 7.5 [PH] (ref 5–8)
PROT UR QL STRIP: NEGATIVE
SP GR UR STRIP: 1.01 (ref 1–1.03)
UROBILINOGEN UR QL STRIP: NORMAL

## 2024-04-25 PROCEDURE — 87086 URINE CULTURE/COLONY COUNT: CPT | Performed by: OBSTETRICS & GYNECOLOGY

## 2024-04-25 PROCEDURE — 99284 EMERGENCY DEPT VISIT MOD MDM: CPT | Performed by: OBSTETRICS & GYNECOLOGY

## 2024-04-25 PROCEDURE — 81003 URINALYSIS AUTO W/O SCOPE: CPT | Performed by: OBSTETRICS & GYNECOLOGY

## 2024-04-25 NOTE — TELEPHONE ENCOUNTER
Pt called complaining of an uncomfortable feeling. Pt declined bleeding or discharge. After Dr Roach was consulted, Pt was told to go into L&D to be triaged.

## 2024-04-25 NOTE — OBED NOTES
[facility] Note OB        Patient Name: Iain Parkinson  YOB: 1995  MRN: [id number]  Admission Date: 2024 12:25 PM  Date of Service: 2024    Chief Complaint: Pelvic Pain (Cerclage on , vaginal discomfort since cerclage placement.)        Subjective     Iain Parkinson is a 28 y.o. female  at 19w2d with Estimated Date of Delivery: 24 who presents with the chief complaint listed above.  She describes a pulsating feeling in her vagina.  She denies contractions, cramping or vaginal bleeding.  The patient is concerned about the risk of infection affecting the pregnancy.  She sees Dr. Moreno and Dr. Roach for her prenatal care. Her pregnancy has been complicated by:  history of  delivery, short cervix with cerclage placement this pregnancy.            Objective   Patient Active Problem List    Diagnosis     Short cervix affecting pregnancy [O26.879]     History of classical  section [Z98.891]     Endometrial polyp [N84.0]     Abnormal uterine bleeding (AUB) [N93.9]         OB History    Para Term  AB Living   2 1 0 1 0 1   SAB IAB Ectopic Molar Multiple Live Births   0 0 0 0 0 1      # Outcome Date GA Lbr Guru/2nd Weight Sex Type Anes PTL Lv   2 Current            1  17 26w2d  754 g (1 lb 10.6 oz) F CS-Classical Gen Y BERNIE      Name: HOLLI,STALIN HOLLINGSWORTH      Apgar1: 3  Apgar5: 4        Past Medical History:   Diagnosis Date    Endometriosis     Seasonal allergies     Uterine polyp        Past Surgical History:   Procedure Laterality Date    CERVICAL CERCLAGE N/A 2024    Procedure: CERVICAL CERCLAGE;  Surgeon: Rosaline Roach MD;  Location: [facility] OR;  Service: Obstetrics;  Laterality: N/A;     SECTION      D & C HYSTEROSCOPY N/A 2022    Procedure: DILATATION AND CURETTAGE HYSTEROSCOPY WITH MYOSURE,POLYPECTOMY;  Surgeon: Serina Moreno MD;  Location: [facility] OR;  Service: Obstetrics/Gynecology;  Laterality: N/A;     MINI-LAPAROTOMY  01/2020    subcutaneous endometrioma       No current facility-administered medications on file prior to encounter.     Current Outpatient Medications on File Prior to Encounter   Medication Sig Dispense Refill    azithromycin (Zithromax Z-José) 250 MG tablet Take 2 tablets (500 mg) by mouth on Day 1, followed by 1 tablet (250 mg) once daily on Days 2 through 5. 6 tablet 0    docusate sodium (Colace) 100 MG capsule Take 1 capsule by mouth 2 (Two) Times a Day. 60 capsule 1    indomethacin (INDOCIN) 50 MG capsule Take 1 capsule by mouth 3 times a day. 6 capsule 0    Prenatal Vit-Fe Fumarate-FA (Prenatal Vitamin) 27-0.8 MG tablet Take 1 tablet by mouth Daily. 90 tablet 4    Progesterone (Prometrium) 200 MG capsule Insert 1 capsule into the vagina Daily. 90 capsule 2    promethazine (PHENERGAN) 12.5 MG tablet Take 1 tablet by mouth Every 6 (Six) Hours As Needed for Nausea or Vomiting. 30 tablet 0       Allergies   Allergen Reactions    Pork-Derived Products Other (See Comments)     Patient does not eat pork products due to Nondenominational         Family History   Problem Relation Age of Onset    Thyroid disease Mother     Diabetes Father     Thyroid disease Sister     Malig Hyperthermia Neg Hx        Social History     Socioeconomic History    Marital status:      Spouse name: Jillian   Tobacco Use    Smoking status: Never    Smokeless tobacco: Never   Vaping Use    Vaping status: Never Used   Substance and Sexual Activity    Alcohol use: Never    Drug use: Never    Sexual activity: Yes     Partners: Male           Review of Systems   Constitutional: Negative.    HENT: Negative.     Respiratory: Negative.     Cardiovascular: Negative.    Gastrointestinal: Negative.    Genitourinary: Negative.    Neurological: Negative.           PHYSICAL EXAM:      VITAL SIGNS:  Vitals:    04/25/24 1253   BP: 125/78   BP Location: Right arm   Patient Position: Sitting   Pulse: 102   Resp: 16   Temp: 99 °F (37.2 °C)  "  TempSrc: Oral   SpO2: 99%   Height: 162.6 cm (64\")        FHR:         160 bpm        PHYSICAL EXAM:    General: well developed; well nourished  anxious   Heart: Not performed.   Lungs  : breathing is unlabored     Abdomen: soft, non-tender; no masses       Cervix: Speculum exam performed.  Small amount of yellow discharge, no bleeding.  Cerclage intact, cervix closed.      Contractions: none        Extremities: peripheral pulses normal, no pedal edema, no clubbing or cyanosis      LABS AND TESTING ORDERED:  Fetal heart rate check.  Urinalysis    LAB RESULTS:    Recent Results (from the past 24 hour(s))   Urinalysis With Culture If Indicated - Urine, Clean Catch    Collection Time: 24  1:17 PM    Specimen: Urine, Clean Catch   Result Value Ref Range    Color, UA Yellow Yellow, Straw    Appearance, UA Clear Clear    pH, UA 7.5 5.0 - 8.0    Specific Gravity, UA 1.010 1.005 - 1.030    Glucose, UA Negative Negative    Ketones, UA Negative Negative    Bilirubin, UA Negative Negative    Blood, UA Negative Negative    Protein, UA Negative Negative    Leuk Esterase, UA Negative Negative    Nitrite, UA Negative Negative    Urobilinogen, UA 0.2 E.U./dL 0.2 - 1.0 E.U./dL       Lab Results   Component Value Date    ABO O 2024    RH Positive 2024       No results found for: \"STREPGPB\"              Assessment & Plan     ASSESSMENT/PLAN:  Iain Parkinson is a 28 y.o. female  at 19w2d who presented with: vaginal discomfort after cerclage placement          Final Impression:  Pregnancy at 19w2d  Short cervix, cerclage in place  Maternal vital signs were reviewed and were unremarkable              Vitals:    24 1253   BP: 125/78   BP Location: Right arm   Patient Position: Sitting   Pulse: 102   Resp: 16   Temp: 99 °F (37.2 °C)   TempSrc: Oral   SpO2: 99%   Height: 162.6 cm (64\")       No results found for: \"STREPGPB\"  Lab Results   Component Value Date    ABO O 2024    RH Positive 2024 "         PLAN:     Reassurance given.  Patient advised to call her Ob office if she experiences bleeding, cramping or contractions  She has a follow up appointment with Dr. Roach in 5 days.    I have spent 20 minutes including face to face time with the patient, greater than 50% in discussion of the diagnosis (counseling) and/or coordination of care.     Radha Manley MD  4/25/2024  13:47 EDT  OB Hospitalist  Phone:  [phone]

## 2024-04-26 LAB — BACTERIA SPEC AEROBE CULT: NO GROWTH

## 2024-04-30 ENCOUNTER — HOSPITAL ENCOUNTER (OUTPATIENT)
Dept: ULTRASOUND IMAGING | Facility: HOSPITAL | Age: 29
Discharge: HOME | End: 2024-04-30
Admitting: OBSTETRICS & GYNECOLOGY
Payer: COMMERCIAL

## 2024-04-30 ENCOUNTER — OFFICE VISIT (OUTPATIENT)
Dept: OBSTETRICS AND GYNECOLOGY | Facility: CLINIC | Age: 29
End: 2024-04-30
Payer: COMMERCIAL

## 2024-04-30 VITALS
HEIGHT: 64 IN | HEART RATE: 102 BPM | SYSTOLIC BLOOD PRESSURE: 123 MMHG | TEMPERATURE: 98.7 F | BODY MASS INDEX: 29.37 KG/M2 | WEIGHT: 172 LBS | DIASTOLIC BLOOD PRESSURE: 67 MMHG

## 2024-04-30 DIAGNOSIS — O26.879 SHORT CERVIX AFFECTING PREGNANCY: Primary | ICD-10-CM

## 2024-04-30 DIAGNOSIS — Z98.891 HISTORY OF CLASSICAL CESAREAN SECTION: ICD-10-CM

## 2024-04-30 DIAGNOSIS — Z87.51 HISTORY OF PRETERM LABOR: ICD-10-CM

## 2024-04-30 DIAGNOSIS — O09.219 PRIOR PRETERM LABOR, ANTEPARTUM: ICD-10-CM

## 2024-04-30 PROCEDURE — 99213 OFFICE O/P EST LOW 20 MIN: CPT | Performed by: OBSTETRICS & GYNECOLOGY

## 2024-04-30 PROCEDURE — 76811 OB US DETAILED SNGL FETUS: CPT

## 2024-04-30 PROCEDURE — 76817 TRANSVAGINAL US OBSTETRIC: CPT

## 2024-05-02 ENCOUNTER — TRANSCRIBE ORDERS (OUTPATIENT)
Dept: ULTRASOUND IMAGING | Facility: HOSPITAL | Age: 29
End: 2024-05-02
Payer: COMMERCIAL

## 2024-05-02 DIAGNOSIS — Z87.51 HISTORY OF PRETERM LABOR: Primary | ICD-10-CM

## 2024-05-07 RX ORDER — PNV NO.95/FERROUS FUM/FOLIC AC 28MG-0.8MG
1 TABLET ORAL DAILY
Qty: 90 TABLET | Refills: 4 | OUTPATIENT
Start: 2024-05-07

## 2024-05-08 ENCOUNTER — ROUTINE PRENATAL (OUTPATIENT)
Dept: OBSTETRICS AND GYNECOLOGY | Facility: CLINIC | Age: 29
End: 2024-05-08
Payer: COMMERCIAL

## 2024-05-08 VITALS — DIASTOLIC BLOOD PRESSURE: 80 MMHG | WEIGHT: 173 LBS | BODY MASS INDEX: 29.7 KG/M2 | SYSTOLIC BLOOD PRESSURE: 119 MMHG

## 2024-05-08 DIAGNOSIS — Z98.890 HISTORY OF CERVICAL CERCLAGE: ICD-10-CM

## 2024-05-08 DIAGNOSIS — Z3A.21 21 WEEKS GESTATION OF PREGNANCY: Primary | ICD-10-CM

## 2024-05-08 LAB
GLUCOSE UR STRIP-MCNC: NEGATIVE MG/DL
PROT UR STRIP-MCNC: ABNORMAL MG/DL

## 2024-05-30 ENCOUNTER — OFFICE VISIT (OUTPATIENT)
Dept: OBSTETRICS AND GYNECOLOGY | Facility: CLINIC | Age: 29
End: 2024-05-30
Payer: COMMERCIAL

## 2024-05-30 ENCOUNTER — HOSPITAL ENCOUNTER (OUTPATIENT)
Dept: ULTRASOUND IMAGING | Facility: HOSPITAL | Age: 29
Discharge: HOME | End: 2024-05-30
Admitting: OBSTETRICS & GYNECOLOGY
Payer: COMMERCIAL

## 2024-05-30 VITALS
HEIGHT: 64 IN | BODY MASS INDEX: 30.05 KG/M2 | TEMPERATURE: 98.7 F | WEIGHT: 176 LBS | DIASTOLIC BLOOD PRESSURE: 81 MMHG | SYSTOLIC BLOOD PRESSURE: 125 MMHG | HEART RATE: 129 BPM

## 2024-05-30 DIAGNOSIS — Z98.890 HISTORY OF CERVICAL CERCLAGE: Primary | ICD-10-CM

## 2024-05-30 DIAGNOSIS — Z87.51 HISTORY OF PRETERM LABOR: ICD-10-CM

## 2024-05-30 DIAGNOSIS — O26.879 SHORT CERVIX AFFECTING PREGNANCY: ICD-10-CM

## 2024-05-30 DIAGNOSIS — Z98.891 HISTORY OF CLASSICAL CESAREAN SECTION: ICD-10-CM

## 2024-05-30 PROCEDURE — 76817 TRANSVAGINAL US OBSTETRIC: CPT

## 2024-05-30 PROCEDURE — 76816 OB US FOLLOW-UP PER FETUS: CPT

## 2024-05-30 NOTE — PROGRESS NOTES
MATERNAL FETAL MEDICINE CONSULT Note    Dear Dr Serina Moreno MD:    Thank you for your kind referral of Iain Parkinson.  As you know, she is a 28 y.o. G 2 P 0101 at 24 2/7 weeks gestation (Estimated Date of Delivery: 24). This is a consult.     Her antepartum course is complicated by:  H/O  delivery at 26 weeks  Cervical incompetence, s/p cerclage 24    Aneuploidy Screening: MaterniT 21 - Negative    HPI: Today, she denies headache, blurry vision, RUQ pain. No vaginal bleeding, no contractions.     Review of History:  Past Medical History:   Diagnosis Date    Endometriosis     Seasonal allergies     Uterine polyp      Past Surgical History:   Procedure Laterality Date    CERVICAL CERCLAGE N/A 2024    Procedure: CERVICAL CERCLAGE;  Surgeon: Rosaline Roach MD;  Location: [facility];  Service: Obstetrics;  Laterality: N/A;     SECTION      D & C HYSTEROSCOPY N/A 2022    Procedure: DILATATION AND CURETTAGE HYSTEROSCOPY WITH MYOSURE,POLYPECTOMY;  Surgeon: Serina Moreno MD;  Location: [facility];  Service: Obstetrics/Gynecology;  Laterality: N/A;    MINI-LAPAROTOMY  2020    subcutaneous endometrioma     Social History     Socioeconomic History    Marital status:      Spouse name: Jillian   Tobacco Use    Smoking status: Never    Smokeless tobacco: Never   Vaping Use    Vaping status: Never Used   Substance and Sexual Activity    Alcohol use: Never    Drug use: Never    Sexual activity: Yes     Partners: Male     Family History   Problem Relation Age of Onset    Thyroid disease Mother     Diabetes Father     Thyroid disease Sister     Malig Hyperthermia Neg Hx       Allergies   Allergen Reactions    Pork-Derived Products Other (See Comments)     Patient does not eat pork products due to Sikhism        Current Outpatient Medications on File Prior to Visit   Medication Sig Dispense Refill    docusate sodium (Colace) 100 MG capsule Take 1 capsule by mouth 2 (Two)  "Times a Day. 60 capsule 1    indomethacin (INDOCIN) 50 MG capsule Take 1 capsule by mouth 3 times a day. 6 capsule 0    Prenatal Vit-Fe Fumarate-FA (Prenatal Vitamin) 27-0.8 MG tablet Take 1 tablet by mouth Daily. 90 tablet 4    Progesterone (Prometrium) 200 MG capsule Insert 1 capsule into the vagina Daily. 90 capsule 2    promethazine (PHENERGAN) 12.5 MG tablet Take 1 tablet by mouth Every 6 (Six) Hours As Needed for Nausea or Vomiting. 30 tablet 0    azithromycin (Zithromax Z-José) 250 MG tablet Take 2 tablets (500 mg) by mouth on Day 1, followed by 1 tablet (250 mg) once daily on Days 2 through 5. (Patient not taking: Reported on 4/30/2024) 6 tablet 0     No current facility-administered medications on file prior to visit.        Past obstetric, gynecological, medical, surgical, family and social history reviewed.  Relevant lab work and imaging reviewed.    Review of systems  Constitutional:  denies fever, chills, malaise.   ENT/Mouth:  denies sore throat, tinnitus  Eyes: denies vision changes/pain  CV:  denies chest pain  Respiratory:  denies cough/SOB  GI:  denies N/V, diarrhea, abdominal pain.    :   denies dysuria  Skin:  denies lesions or pruritus   Neuro:  denies weakness, focal neurologic symptoms    Vitals:    05/30/24 1510 05/30/24 1627   BP: 125/81    BP Location: Right arm    Patient Position: Sitting    Pulse: (!) 129 (!) 129   Temp: 98.7 °F (37.1 °C)    TempSrc: Temporal    Weight: 79.8 kg (176 lb)    Height: 162.6 cm (64\")        PHYSICAL EXAM   GENERAL: Not in acute distress, AAOx3, pleasant  CARDIO: regular rate and rhythm  PULM: symmetric chest rise, speaking in complete sentences without difficulty  NEURO: awake, alert and oriented to person, place, and time  ABDOMINAL: No fundal tenderness, no rebound or guarding, gravid  EXTREMITIES: no bilateral lower extremity edema/tenderness  SKIN: Warm, well-perfused      ULTRASOUND   Please view full ultrasound note on Imaging tab in " ViewPoint.  Cephalic presentation.  Fundal placenta.  JOSE A 16.7 cm, which is normal.   g (38%, AC 41%)  Follow up heart views appear normal.  Cervical length 3.8 cm with cerclage intact and in good position.    ASSESSMENT/COUNSELIN y.o. G 2 P 0101 at 24 2/7 weeks gestation (Estimated Date of Delivery: 24)    -Pregnancy  [ X ] stable  [   ] improving [  ] worsening    Diagnoses and all orders for this visit:    1. History of cervical cerclage (Primary)    2. Short cervix affecting pregnancy    3. History of classical  section       History of  delivery  Hx of delivery at 26 weeks     She had bleeding and went to the hospital, she was 1 cm dilated with bulging bag of water. She was delivered by . This happened about 7 years ago.  She does not know if she had a short cervix or not prior and says she was never told this.        In my opinion, if there is a pattern of cervical shortening, we will proceed with cerclage--she had a significant decrease in her CL, so we proceeded with cerclage.  She underwent on  without complication.  She is doing well.  She will try progesterone one more time, although she has not had palpitations since it was stopped--reviewed this is an unlikely side effect, but will see how she does over the next week or so.  Given option to stop vs try again and she wants to try one more time.  She is s/p indocin and ancef/azithro after cerclage.      Reviewed modified activity.      Update 24  Reviewed ultrasound results with patient. Anatomy complete. Patients heart rate elevated in the 120's. Pt denies SOB, dizziness, or any symptoms of tachycardia. States the past day or two she felt like she could feel her heart beating more than normal. Offered referral to cardiology - pt declines at this time. Had EKG in April that was normal. Seeing Dr Moreno next week and will let her know if any other symptoms arise. Good luck with remainder of pregnancy.      Summary of Plan  -Continue progesterone until 36 weeks--if re-emergence of symptoms, will stop  - Cerclage to be removed at 36 weeks  -Reviewed modified activity  -Serial growths at primary OB after completion of anatomy.      Follow-up: No follow up with MFM scheduled, but I am happy to see for follow up at request of primary obstetrician      Thank you for the consult and opportunity to care for this patient.  Please feel free to reach out with any questions or concerns.      I spent 17 minutes caring for this patient on this date of service. This time includes time spent by me in the following activities: preparing for the visit, reviewing tests, obtaining and/or reviewing a separately obtained history, performing a medically appropriate examination and/or evaluation, counseling and educating the patient/family/caregiver and independently interpreting results and communicating that information with the patient/family/caregiver with greater than 50% spent in counseling and coordination of care.     REED Cueva  Maternal Fetal Medicine-[facility]  Office: [phone]  [email or web address]

## 2024-05-30 NOTE — PROGRESS NOTES
Pt reports that she is doing well and denies vaginal bleeding, cramping, contractions or LOF at this time. Reports active fetal movement. Reviewed when to call OB office or present to L&D for evaluation with symptoms such as decreased fetal movement, vaginal bleeding, LOF or ctxs. Pt verbalized understanding. Denies HA, visual changes or epigastric pain. Denies any additional complaints at time of appointment. Next OB appointment scheduled for 06/05/2024. Patient heart rate was elevated patient stated that she has felt like her heart has been racing for 2 days REED Castano was notified of patients complaint.    Vitals:    05/30/24 1510   BP: 125/81   Temp: 98.7 °F (37.1 °C)

## 2024-05-30 NOTE — LETTER
May 31, 2024     Serina Moreno MD  [address]  [address]  [facility] [address]    Patient: Iain Parkinson   YOB: 1995   Date of Visit: 2024       Dear Serina Moreno MD    Iain Parkinson was in my office today. Below is a copy of my note.    If you have questions, please do not hesitate to call me. I look forward to following Iain along with you.         Sincerely,        REED Watson        CC: No Recipients                          MATERNAL FETAL MEDICINE CONSULT Note    Dear Dr Serina Moreno MD:    Thank you for your kind referral of Iain Parkinson.  As you know, she is a 28 y.o. G 2 P 0101 at 24 2/7 weeks gestation (Estimated Date of Delivery: 24). This is a consult.     Her antepartum course is complicated by:  H/O  delivery at 26 weeks  Cervical incompetence, s/p cerclage 24    Aneuploidy Screening: MaterniT 21 - Negative    HPI: Today, she denies headache, blurry vision, RUQ pain. No vaginal bleeding, no contractions.     Review of History:  Past Medical History:   Diagnosis Date   • Endometriosis    • Seasonal allergies    • Uterine polyp      Past Surgical History:   Procedure Laterality Date   • CERVICAL CERCLAGE N/A 2024    Procedure: CERVICAL CERCLAGE;  Surgeon: Rosaline Roach MD;  Location: [facility];  Service: Obstetrics;  Laterality: N/A;   •  SECTION     • D & C HYSTEROSCOPY N/A 2022    Procedure: DILATATION AND CURETTAGE HYSTEROSCOPY WITH MYOSURE,POLYPECTOMY;  Surgeon: Serina Moreno MD;  Location: [facility];  Service: Obstetrics/Gynecology;  Laterality: N/A;   • MINI-LAPAROTOMY  2020    subcutaneous endometrioma     Social History     Socioeconomic History   • Marital status:      Spouse name: Jillian   Tobacco Use   • Smoking status: Never   • Smokeless tobacco: Never   Vaping Use   • Vaping status: Never Used   Substance and Sexual Activity   • Alcohol use: Never   • Drug use: Never   • Sexual  activity: Yes     Partners: Male     Family History   Problem Relation Age of Onset   • Thyroid disease Mother    • Diabetes Father    • Thyroid disease Sister    • Malig Hyperthermia Neg Hx       Allergies   Allergen Reactions   • Pork-Derived Products Other (See Comments)     Patient does not eat pork products due to Pentecostalism        Current Outpatient Medications on File Prior to Visit   Medication Sig Dispense Refill   • docusate sodium (Colace) 100 MG capsule Take 1 capsule by mouth 2 (Two) Times a Day. 60 capsule 1   • indomethacin (INDOCIN) 50 MG capsule Take 1 capsule by mouth 3 times a day. 6 capsule 0   • Prenatal Vit-Fe Fumarate-FA (Prenatal Vitamin) 27-0.8 MG tablet Take 1 tablet by mouth Daily. 90 tablet 4   • Progesterone (Prometrium) 200 MG capsule Insert 1 capsule into the vagina Daily. 90 capsule 2   • promethazine (PHENERGAN) 12.5 MG tablet Take 1 tablet by mouth Every 6 (Six) Hours As Needed for Nausea or Vomiting. 30 tablet 0   • azithromycin (Zithromax Z-Jsoé) 250 MG tablet Take 2 tablets (500 mg) by mouth on Day 1, followed by 1 tablet (250 mg) once daily on Days 2 through 5. (Patient not taking: Reported on 4/30/2024) 6 tablet 0     No current facility-administered medications on file prior to visit.        Past obstetric, gynecological, medical, surgical, family and social history reviewed.  Relevant lab work and imaging reviewed.    Review of systems  Constitutional:  denies fever, chills, malaise.   ENT/Mouth:  denies sore throat, tinnitus  Eyes: denies vision changes/pain  CV:  denies chest pain  Respiratory:  denies cough/SOB  GI:  denies N/V, diarrhea, abdominal pain.    :   denies dysuria  Skin:  denies lesions or pruritus   Neuro:  denies weakness, focal neurologic symptoms    Vitals:    05/30/24 1510 05/30/24 1627   BP: 125/81    BP Location: Right arm    Patient Position: Sitting    Pulse: (!) 129 (!) 129   Temp: 98.7 °F (37.1 °C)    TempSrc: Temporal    Weight: 79.8 kg (176 lb)   "  Height: 162.6 cm (64\")        PHYSICAL EXAM   GENERAL: Not in acute distress, AAOx3, pleasant  CARDIO: regular rate and rhythm  PULM: symmetric chest rise, speaking in complete sentences without difficulty  NEURO: awake, alert and oriented to person, place, and time  ABDOMINAL: No fundal tenderness, no rebound or guarding, gravid  EXTREMITIES: no bilateral lower extremity edema/tenderness  SKIN: Warm, well-perfused      ULTRASOUND   Please view full ultrasound note on Imaging tab in ViewPoint.  Cephalic presentation.  Fundal placenta.  JOSE A 16.7 cm, which is normal.   g (38%, AC 41%)  Follow up heart views appear normal.  Cervical length 3.8 cm with cerclage intact and in good position.    ASSESSMENT/COUNSELIN y.o. G 2 P 0101 at 24 2/7 weeks gestation (Estimated Date of Delivery: 24)    -Pregnancy  [ X ] stable  [   ] improving [  ] worsening    Diagnoses and all orders for this visit:    1. History of cervical cerclage (Primary)    2. Short cervix affecting pregnancy    3. History of classical  section       History of  delivery  Hx of delivery at 26 weeks     She had bleeding and went to the hospital, she was 1 cm dilated with bulging bag of water. She was delivered by . This happened about 7 years ago.  She does not know if she had a short cervix or not prior and says she was never told this.        In my opinion, if there is a pattern of cervical shortening, we will proceed with cerclage--she had a significant decrease in her CL, so we proceeded with cerclage.  She underwent on  without complication.  She is doing well.  She will try progesterone one more time, although she has not had palpitations since it was stopped--reviewed this is an unlikely side effect, but will see how she does over the next week or so.  Given option to stop vs try again and she wants to try one more time.  She is s/p indocin and ancef/azithro after cerclage.      Reviewed modified " activity.      Update 05/30/24  Reviewed ultrasound results with patient. Anatomy complete. Patients heart rate elevated in the 120's. Pt denies SOB, dizziness, or any symptoms of tachycardia. States the past day or two she felt like she could feel her heart beating more than normal. Offered referral to cardiology - pt declines at this time. Had EKG in April that was normal. Seeing Dr Moreno next week and will let her know if any other symptoms arise. Good luck with remainder of pregnancy.     Summary of Plan  -Continue progesterone until 36 weeks--if re-emergence of symptoms, will stop  - Cerclage to be removed at 36 weeks  -Reviewed modified activity  -Serial growths at primary OB after completion of anatomy.      Follow-up: No follow up with MFM scheduled, but I am happy to see for follow up at request of primary obstetrician      Thank you for the consult and opportunity to care for this patient.  Please feel free to reach out with any questions or concerns.      I spent 17 minutes caring for this patient on this date of service. This time includes time spent by me in the following activities: preparing for the visit, reviewing tests, obtaining and/or reviewing a separately obtained history, performing a medically appropriate examination and/or evaluation, counseling and educating the patient/family/caregiver and independently interpreting results and communicating that information with the patient/family/caregiver with greater than 50% spent in counseling and coordination of care.     REED Cueva  Maternal Fetal Medicine-[facility]  Office: [phone]  [email or web address]

## 2024-06-05 ENCOUNTER — ROUTINE PRENATAL (OUTPATIENT)
Dept: OBSTETRICS AND GYNECOLOGY | Facility: CLINIC | Age: 29
End: 2024-06-05
Payer: COMMERCIAL

## 2024-06-05 VITALS — WEIGHT: 179 LBS | DIASTOLIC BLOOD PRESSURE: 74 MMHG | BODY MASS INDEX: 30.73 KG/M2 | SYSTOLIC BLOOD PRESSURE: 115 MMHG

## 2024-06-05 DIAGNOSIS — Z3A.25 25 WEEKS GESTATION OF PREGNANCY: Primary | ICD-10-CM

## 2024-06-05 DIAGNOSIS — Z98.890 HISTORY OF CERVICAL CERCLAGE: ICD-10-CM

## 2024-06-05 DIAGNOSIS — Z98.891 HISTORY OF CLASSICAL CESAREAN SECTION: ICD-10-CM

## 2024-06-05 LAB
GLUCOSE UR STRIP-MCNC: NEGATIVE MG/DL
PROT UR STRIP-MCNC: NEGATIVE MG/DL

## 2024-06-05 NOTE — PROGRESS NOTES
Chief Complaint   Patient presents with    Routine Prenatal Visit     25 weeks       Iain Parkinson is a 28 y.o.  at 25w1d  here for routine OB visit  Reports no major issues  On vaginal progesterone  No contractions or bleeding    /74   Wt 81.2 kg (179 lb)   LMP 10/26/2023   BMI 30.73 kg/m²        Gen: NAD, well appearing  Abd: nontender    See OB Flowsheet    ASSESSMENT/PLAN:  Diagnoses and all orders for this visit:    1. 25 weeks gestation of pregnancy (Primary)  -     POC Urinalysis Dipstick    2. History of cervical cerclage    3. History of classical  section      Will plan for repeat CS at 36-37w given h/o classical and will plan to remove cerclage at that time  Continue progesterone  Routine counseling pertinent to this stage of pregnancy was reviewed including  labor precautions, serial growth US.    Counseled on Tdap at next visit  Doing GCT today  Return in about 4 weeks (around 7/3/2024) for growth US, OB visit.

## 2024-06-06 ENCOUNTER — TELEPHONE (OUTPATIENT)
Dept: OBSTETRICS AND GYNECOLOGY | Facility: CLINIC | Age: 29
End: 2024-06-06
Payer: COMMERCIAL

## 2024-06-06 DIAGNOSIS — O24.410 GDM (GESTATIONAL DIABETES MELLITUS), CLASS A1: Primary | ICD-10-CM

## 2024-06-06 LAB
ERYTHROCYTE [DISTWIDTH] IN BLOOD BY AUTOMATED COUNT: 13 % (ref 12.3–15.4)
GLUCOSE 1H P 50 G GLC PO SERPL-MCNC: 200 MG/DL (ref 65–139)
HCT VFR BLD AUTO: 33.8 % (ref 34–46.6)
HGB BLD-MCNC: 11.3 G/DL (ref 12–15.9)
MCH RBC QN AUTO: 31 PG (ref 26.6–33)
MCHC RBC AUTO-ENTMCNC: 33.4 G/DL (ref 31.5–35.7)
MCV RBC AUTO: 92.9 FL (ref 79–97)
PLATELET # BLD AUTO: 232 10*3/MM3 (ref 140–450)
RBC # BLD AUTO: 3.64 10*6/MM3 (ref 3.77–5.28)
T PALLIDUM AB SER QL IF: NON REACTIVE
WBC # BLD AUTO: 9.23 10*3/MM3 (ref 3.4–10.8)

## 2024-06-06 RX ORDER — BLOOD-GLUCOSE METER
1 KIT MISCELLANEOUS AS NEEDED
Qty: 1 EACH | Refills: 0 | Status: SHIPPED | OUTPATIENT
Start: 2024-06-06

## 2024-06-06 RX ORDER — SYRING-NEEDL,DISP,INSUL,0.3 ML 30 GX5/16"
SYRINGE, EMPTY DISPOSABLE MISCELLANEOUS
Qty: 1 EACH | Refills: 12 | Status: SHIPPED | OUTPATIENT
Start: 2024-06-06

## 2024-06-06 NOTE — TELEPHONE ENCOUNTER
Please let patient know that because her glucose challenge test was 200 we would recommend patterning blood sugars at home with a glucometer. I will send supplies to her pharmacy and I recommend an appointment with Rosaline Walls in the next 2 weeks or so to learn more about diet/patterning. Thanks!

## 2024-06-20 ENCOUNTER — ROUTINE PRENATAL (OUTPATIENT)
Dept: OBSTETRICS AND GYNECOLOGY | Facility: CLINIC | Age: 29
End: 2024-06-20
Payer: COMMERCIAL

## 2024-06-20 VITALS — WEIGHT: 176 LBS | SYSTOLIC BLOOD PRESSURE: 135 MMHG | DIASTOLIC BLOOD PRESSURE: 73 MMHG | BODY MASS INDEX: 30.21 KG/M2

## 2024-06-20 DIAGNOSIS — O34.32 CERVICAL CERCLAGE SUTURE PRESENT IN SECOND TRIMESTER: ICD-10-CM

## 2024-06-20 DIAGNOSIS — O26.879 SHORT CERVIX AFFECTING PREGNANCY: ICD-10-CM

## 2024-06-20 DIAGNOSIS — Z3A.27 27 WEEKS GESTATION OF PREGNANCY: Primary | ICD-10-CM

## 2024-06-20 DIAGNOSIS — O24.410 DIET CONTROLLED GESTATIONAL DIABETES MELLITUS (GDM) IN SECOND TRIMESTER: ICD-10-CM

## 2024-06-20 DIAGNOSIS — Z98.891 HISTORY OF CLASSICAL CESAREAN SECTION: ICD-10-CM

## 2024-06-20 LAB
GLUCOSE UR STRIP-MCNC: NEGATIVE MG/DL
PROT UR STRIP-MCNC: NEGATIVE MG/DL

## 2024-06-20 NOTE — PROGRESS NOTES
CC: OB follow up, GDM education     OB follow up     Iain Parkinson is a 28 y.o.  27w2d being seen today for her obstetrical visit.  Patient reports no complaints. Fetal movement: normal. She is here today for education on the management of diabetes in pregnancy. Family hx: father has T2D  Prepregnancy wt 165. BMI 30.21    She has been checking glucose. Reports PP are all normal range. FBS mostly normal range, rare 100.     Review of Systems  Cramping/contractions : denies  Vaginal bleeding: denies  Fetal movement normal    /73   Wt 79.8 kg (176 lb)   LMP 10/26/2023   BMI 30.21 kg/m²     Assessment/Plan  Diagnoses and all orders for this visit:    1. 27 weeks gestation of pregnancy (Primary)  -     POC Urinalysis Dipstick    2. History of classical  section    3. Short cervix affecting pregnancy    4. Cervical cerclage suture present in second trimester    5. Diet controlled gestational diabetes mellitus (GDM) in second trimester      Discussed fetal kick counts    Counseled on dietary modifications and restrictions, risks of hyperglycemia on the pregnancy and for , life time risks for type 2 diabetes, discussed frequency of accuchecks. Reviewed goals of fasting 60 - 90 and 2 hour postprandial< 120 throughout pregnancy, S&S of hypoglycemia, corrective action for hypoglycemia, sick day management, fetal kick counts, and exercise. Provided with resources and glucose log. Reviewed use of glucometer. All questions answered appropriately. Encouraged to call office with any further questions. Education material given. Instructed to call the office for glucose >180     [email or web address]    Disc importance of good glycemic control in pregnancy. Disc risks associated with poorly or uncontrolled glucose including but not limited to excessive birth weight,  birth, lung immaturity, polyhydramnios,  hypoglycemia, stillbirth,  etc.      Pt will send blood glucose log to me weekly through [employer] or via telephone.    Reviewed this stage of pregnancy  Problem list updated   Follow up in 2 week(s) with Dr. Moreno    I spent 30 minutes caring for Iain on this date of service. This time includes time spent by me in the following activities: preparing for the visit, reviewing tests, performing a medically appropriate examination and/or evaluation, counseling and educating the patient/family/caregiver, referring and communicating with other health care professionals, documenting information in the medical record, independently interpreting results and communicating that information with the patient/family/caregiver, care coordination, obtaining a separately obtained history, and reviewing a separately obtained history    Rosaline Walls, REED  6/20/2024  13:27 EDT

## 2024-06-27 ENCOUNTER — HOSPITAL ENCOUNTER (EMERGENCY)
Facility: HOSPITAL | Age: 29
Discharge: HOME | End: 2024-06-28
Attending: OBSTETRICS & GYNECOLOGY | Admitting: OBSTETRICS & GYNECOLOGY
Payer: COMMERCIAL

## 2024-06-27 PROCEDURE — 99284 EMERGENCY DEPT VISIT MOD MDM: CPT | Performed by: OBSTETRICS & GYNECOLOGY

## 2024-06-28 VITALS
DIASTOLIC BLOOD PRESSURE: 59 MMHG | TEMPERATURE: 98.2 F | HEART RATE: 111 BPM | BODY MASS INDEX: 28.87 KG/M2 | OXYGEN SATURATION: 98 % | HEIGHT: 66 IN | SYSTOLIC BLOOD PRESSURE: 106 MMHG | RESPIRATION RATE: 16 BRPM | WEIGHT: 179.6 LBS

## 2024-06-28 PROCEDURE — 59025 FETAL NON-STRESS TEST: CPT | Performed by: OBSTETRICS & GYNECOLOGY

## 2024-07-03 ENCOUNTER — ROUTINE PRENATAL (OUTPATIENT)
Dept: OBSTETRICS AND GYNECOLOGY | Facility: CLINIC | Age: 29
End: 2024-07-03
Payer: COMMERCIAL

## 2024-07-03 VITALS — BODY MASS INDEX: 28.99 KG/M2 | WEIGHT: 179.6 LBS | DIASTOLIC BLOOD PRESSURE: 72 MMHG | SYSTOLIC BLOOD PRESSURE: 108 MMHG

## 2024-07-03 DIAGNOSIS — O34.32 CERVICAL CERCLAGE SUTURE PRESENT IN SECOND TRIMESTER: ICD-10-CM

## 2024-07-03 DIAGNOSIS — Z98.891 HISTORY OF CLASSICAL CESAREAN SECTION: ICD-10-CM

## 2024-07-03 DIAGNOSIS — O26.879 SHORT CERVIX AFFECTING PREGNANCY: ICD-10-CM

## 2024-07-03 DIAGNOSIS — Z3A.29 29 WEEKS GESTATION OF PREGNANCY: Primary | ICD-10-CM

## 2024-07-03 DIAGNOSIS — O24.410 DIET CONTROLLED GESTATIONAL DIABETES MELLITUS (GDM) IN SECOND TRIMESTER: ICD-10-CM

## 2024-07-03 LAB
GLUCOSE UR STRIP-MCNC: NEGATIVE MG/DL
PROT UR STRIP-MCNC: NEGATIVE MG/DL

## 2024-07-03 NOTE — PROGRESS NOTES
Chief Complaint   Patient presents with    Routine Prenatal Visit     29 weeks       Iain Parkinson is a 28 y.o.  at 29w1d  here for routine OB visit  Reports no major issues  She did stop using progesterone as it was harder to insert  She does not have a blood sugar log with her but reports highest she has is 150 and that is if she eats carbs without exercise or protein. This has only happened three times.  She usually has fastings in the 70-80s and most post meal are 110-120.    She sometimes feels shaking when she is hungry and usually in that case her blood sugar is in the 70's  Denies contractions. Notes normal fetal movements    /72   Wt 81.5 kg (179 lb 9.6 oz)   LMP 10/26/2023   BMI 28.99 kg/m²    Fetal Heart Rate: +  Movement: Present   Gen: NAD, well appearing  Abd: nontender      See OB Flowsheet    ASSESSMENT/PLAN:  Diagnoses and all orders for this visit:    1. 29 weeks gestation of pregnancy (Primary)  -     POC Urinalysis Dipstick    2. History of classical  section    3. Short cervix affecting pregnancy    4. Cervical cerclage suture present in second trimester    5. Diet controlled gestational diabetes mellitus (GDM) in second trimester    Send blood sugars via MyChart  Reviewed vaginal progesterone - okay to continue and just place as high as able in the vagina.  Recommend Tdap, will consider at next visit  Growth US within normal limits  Reviewed ERAS and CS instructions  Routine counseling pertinent to this stage of pregnancy was reviewed including fetal movement, third trimester labor precautions.   Return in about 2 weeks (around 2024) for OB visit and schedule BPP weekly to start in 3 weeks.

## 2024-07-26 ENCOUNTER — ROUTINE PRENATAL (OUTPATIENT)
Dept: OBSTETRICS AND GYNECOLOGY | Facility: CLINIC | Age: 29
End: 2024-07-26
Payer: COMMERCIAL

## 2024-07-26 VITALS — SYSTOLIC BLOOD PRESSURE: 102 MMHG | DIASTOLIC BLOOD PRESSURE: 69 MMHG | WEIGHT: 179 LBS | BODY MASS INDEX: 28.89 KG/M2

## 2024-07-26 DIAGNOSIS — Z3A.32 32 WEEKS GESTATION OF PREGNANCY: Primary | ICD-10-CM

## 2024-07-26 DIAGNOSIS — Z23 NEED FOR TDAP VACCINATION: ICD-10-CM

## 2024-07-26 LAB
GLUCOSE UR STRIP-MCNC: NEGATIVE MG/DL
PROT UR STRIP-MCNC: NEGATIVE MG/DL

## 2024-07-26 RX ORDER — PEN NEEDLE, DIABETIC 32GX 5/32"
1 NEEDLE, DISPOSABLE MISCELLANEOUS DAILY
Qty: 100 EACH | Refills: 1 | Status: SHIPPED | OUTPATIENT
Start: 2024-07-26

## 2024-07-26 NOTE — PROGRESS NOTES
Chief Complaint   Patient presents with    Routine Prenatal Visit     32 weeks       Iain Parkinson is a 28 y.o.  at 32w3d  here for routine OB visit  Reports no major issues.    /69   Wt 81.2 kg (179 lb)   LMP 10/26/2023   BMI 28.89 kg/m²        Gen: NAD, well appearing  Abd: nontender    See OB Flowsheet    ASSESSMENT/PLAN:  Diagnoses and all orders for this visit:    1. 32 weeks gestation of pregnancy (Primary)  -     POC Urinalysis Dipstick    2. Gestational diabetes mellitus (GDM) in childbirth, insulin controlled  -     Ambulatory Referral to [facility]/Perinatology    3. Need for Tdap vaccination    Other orders  -     Insulin Glargine (LANTUS SOLOSTAR) 100 UNIT/ML injection pen; Inject 8 Units under the skin into the appropriate area as directed Every Night.  Dispense: 3 mL; Refill: 1      Reviewed blood sugars which are mostly mildly elevated.  We will start night time insulin with lantus 8U QHS. She was instructed on how to administer. Will also refer back to [facility] for comanagement.  Follow up in one week  BPP today , no signs of  labor  Tdap today  Routine counseling pertinent to this stage of pregnancy was reviewed including third trimester precautions  Return in about 1 week (around 2024) for OB visit, BPP, growth US.

## 2024-07-29 ENCOUNTER — TELEPHONE (OUTPATIENT)
Dept: OBSTETRICS AND GYNECOLOGY | Facility: CLINIC | Age: 29
End: 2024-07-29
Payer: COMMERCIAL

## 2024-07-29 NOTE — TELEPHONE ENCOUNTER
Call manuel Timmons to go over M diabetic education prior to appointment on 8/6. Pt did not answer at this time. Voicemail was left asking pt to call M at [phone].

## 2024-07-30 ENCOUNTER — TELEPHONE (OUTPATIENT)
Dept: OBSTETRICS AND GYNECOLOGY | Facility: CLINIC | Age: 29
End: 2024-07-30
Payer: COMMERCIAL

## 2024-07-30 NOTE — TELEPHONE ENCOUNTER
Call placed to Iain to review [facility] diabetes management. Pt reports she is currently trying to check her blood sugars 4 times a day: fasting and 2 hours after eating. Discussed with pt MFM management and checking blood sugars 7 times a day: before meals, one hour after meals and before bedtime snack. Pt agreeable to this adjustment.     Discussed carb goals and eating every 2-3 hours with 30g carbs for breakfast, 45g carbs for lunch and dinner and 10-15g carbs with snacks in between meals and at bedtime. Pt verbalized understanding. Glucose goals of 60-90 before meals and  one hour after meals were reviewed. Discussed with patient that if glucose values are consistently outside of the targeted range, MFM will discuss starting her on medications/adjusting her current medication regimen. Patient reports she was given a prescription for Lantus 8 units QHS but has not started it at this time. Iain notes that she has made some adjustments to her diet and reports that her fasting blood sugars have all been under 90 since making these adjustments. Encouraged pt to start checking blood sugars 7 times a day and send in blood sugars on 8/2 for initial review prior to next appointment. Iain verbalized understanding.       [employer] message sent to patient with M glucose log attached. Will provide patient with [facility] folder with diabetes packet, hypoglycemia protocol and extra glucose logs at time of appointment. All questions answered at time of call and encouraged patient to call this RN with any additional questions prior to her appointment. [facility] looks forward to seeing pt on 8/6.

## 2024-07-31 ENCOUNTER — ROUTINE PRENATAL (OUTPATIENT)
Dept: OBSTETRICS AND GYNECOLOGY | Facility: CLINIC | Age: 29
End: 2024-07-31
Payer: COMMERCIAL

## 2024-07-31 VITALS — BODY MASS INDEX: 28.57 KG/M2 | SYSTOLIC BLOOD PRESSURE: 103 MMHG | DIASTOLIC BLOOD PRESSURE: 70 MMHG | WEIGHT: 177 LBS

## 2024-07-31 DIAGNOSIS — Z98.891 HISTORY OF CLASSICAL CESAREAN SECTION: ICD-10-CM

## 2024-07-31 DIAGNOSIS — O34.33 CERVICAL CERCLAGE SUTURE PRESENT IN THIRD TRIMESTER: ICD-10-CM

## 2024-07-31 DIAGNOSIS — Z3A.33 33 WEEKS GESTATION OF PREGNANCY: Primary | ICD-10-CM

## 2024-07-31 DIAGNOSIS — O24.414 INSULIN CONTROLLED GESTATIONAL DIABETES MELLITUS (GDM) IN THIRD TRIMESTER: ICD-10-CM

## 2024-07-31 LAB
GLUCOSE UR STRIP-MCNC: NEGATIVE MG/DL
PROT UR STRIP-MCNC: NEGATIVE MG/DL

## 2024-07-31 NOTE — PROGRESS NOTES
Chief Complaint   Patient presents with    Routine Prenatal Visit     33 weeks       Iain Parkinson is a 28 y.o.  at 33w1d  here for routine OB visit  Reports did not start insulin as she changed her diet and this improved her blood sugars to < 90 fasting and < 120 postprandial    /70   Wt 80.3 kg (177 lb)   LMP 10/26/2023   BMI 28.57 kg/m²        Gen: NAD, well appearing  Abd: nontender    See OB Flowsheet    ASSESSMENT/PLAN:  Diagnoses and all orders for this visit:    1. 33 weeks gestation of pregnancy (Primary)  -     POC Urinalysis Dipstick    2. History of classical  section    3. Cervical cerclage suture present in third trimester    4. Insulin controlled gestational diabetes mellitus (GDM) in third trimester    Reviewed with patient if blood sugars are at goal with dietary changes will hold medication for now.  Does not have log but has paper to bring to MFM next week and will pattern  BPP, growth US reviewed  Routine counseling pertinent to this stage of pregnancy was reviewed including third trimester precautions  Return in about 2 weeks (around 2024) for BPP with OB visit; has MFM appt in 1 week with US.

## 2024-08-01 ENCOUNTER — TRANSCRIBE ORDERS (OUTPATIENT)
Dept: ULTRASOUND IMAGING | Facility: HOSPITAL | Age: 29
End: 2024-08-01
Payer: COMMERCIAL

## 2024-08-01 DIAGNOSIS — O24.419 GESTATIONAL DIABETES MELLITUS (GDM), ANTEPARTUM, GESTATIONAL DIABETES METHOD OF CONTROL UNSPECIFIED: Primary | ICD-10-CM

## 2024-08-02 ENCOUNTER — DOCUMENTATION (OUTPATIENT)
Dept: OBSTETRICS AND GYNECOLOGY | Facility: CLINIC | Age: 29
End: 2024-08-02
Payer: COMMERCIAL

## 2024-08-02 NOTE — PROGRESS NOTES
Weekly MFM glucose log evaluation: Attempts made to review blood sugar logs this week with no response.   M will plan to review blood glucose logs next week.

## 2024-08-05 ENCOUNTER — DOCUMENTATION (OUTPATIENT)
Dept: OBSTETRICS AND GYNECOLOGY | Facility: CLINIC | Age: 29
End: 2024-08-05
Payer: COMMERCIAL

## 2024-08-05 RX ORDER — METFORMIN HYDROCHLORIDE 500 MG/1
500 TABLET, EXTENDED RELEASE ORAL
Qty: 60 TABLET | Refills: 3 | Status: SHIPPED | OUTPATIENT
Start: 2024-08-05

## 2024-08-05 NOTE — PROGRESS NOTES
Weekly MFM glucose log evaluation. The following adjustments were made:    Lantus: 0/8 (ordered by OB. pt has not started yet. recommended she start).   Start Metformin 500mg BID    Encouraged snacks and avoid skipping meals.     MFM will review glucose logs again on 8/6.

## 2024-08-06 ENCOUNTER — HOSPITAL ENCOUNTER (OUTPATIENT)
Dept: ULTRASOUND IMAGING | Facility: HOSPITAL | Age: 29
Discharge: HOME | End: 2024-08-06
Admitting: OBSTETRICS & GYNECOLOGY
Payer: COMMERCIAL

## 2024-08-06 ENCOUNTER — OFFICE VISIT (OUTPATIENT)
Dept: OBSTETRICS AND GYNECOLOGY | Facility: CLINIC | Age: 29
End: 2024-08-06
Payer: COMMERCIAL

## 2024-08-06 VITALS
SYSTOLIC BLOOD PRESSURE: 118 MMHG | TEMPERATURE: 98.2 F | BODY MASS INDEX: 28.45 KG/M2 | WEIGHT: 177 LBS | DIASTOLIC BLOOD PRESSURE: 72 MMHG | HEIGHT: 66 IN | HEART RATE: 100 BPM

## 2024-08-06 DIAGNOSIS — Z98.891 HISTORY OF CLASSICAL CESAREAN SECTION: ICD-10-CM

## 2024-08-06 DIAGNOSIS — O24.414 INSULIN CONTROLLED GESTATIONAL DIABETES MELLITUS (GDM) IN THIRD TRIMESTER: ICD-10-CM

## 2024-08-06 DIAGNOSIS — O24.419 GESTATIONAL DIABETES MELLITUS (GDM), ANTEPARTUM, GESTATIONAL DIABETES METHOD OF CONTROL UNSPECIFIED: ICD-10-CM

## 2024-08-06 DIAGNOSIS — O34.33 CERVICAL CERCLAGE SUTURE PRESENT IN THIRD TRIMESTER: Primary | ICD-10-CM

## 2024-08-06 PROCEDURE — 76819 FETAL BIOPHYS PROFIL W/O NST: CPT

## 2024-08-06 PROCEDURE — 76816 OB US FOLLOW-UP PER FETUS: CPT

## 2024-08-06 PROCEDURE — 99214 OFFICE O/P EST MOD 30 MIN: CPT | Performed by: NURSE PRACTITIONER

## 2024-08-06 NOTE — PROGRESS NOTES
Pt reports that she is doing well and denies vaginal bleeding, cramping, contractions or LOF at this time. Reports active fetal movement. Reviewed when to call OB office or present to L&D for evaluation with symptoms such as decreased fetal movement, vaginal bleeding, LOF or ctxs. Pt verbalized understanding. Denies HA, visual changes or epigastric pain. Denies any additional complaints at time of appointment. Next OB appointment scheduled for 08/07/2024. Patient brought in blood sugar logs. Blood sugar is 94.    Vitals:    08/06/24 1113   BP: 118/72   Pulse: 100   Temp: 98.2 °F (36.8 °C)

## 2024-08-06 NOTE — PROGRESS NOTES
MATERNAL FETAL MEDICINE CONSULT Note    Dear Dr Serina Moreno MD:    Thank you for your kind referral of Iain Parkinson.  As you know, she is a 28 y.o. G 2 P 0101 at 34 0/7 weeks gestation (Estimated Date of Delivery: 24). This is a consult.     Her antepartum course is complicated by:  H/O  delivery at 26 weeks  Cervical incompetence, s/p cerclage 24  GDMA2  H/O Classical     Aneuploidy Screening: MaterniT 21 - Negative    HPI: Today, she denies headache, blurry vision, RUQ pain. No vaginal bleeding, no contractions.     Review of History:  Past Medical History:   Diagnosis Date    Endometriosis     Seasonal allergies     Uterine polyp      Past Surgical History:   Procedure Laterality Date    CERVICAL CERCLAGE N/A 2024    Procedure: CERVICAL CERCLAGE;  Surgeon: Rosaline Roach MD;  Location: [facility];  Service: Obstetrics;  Laterality: N/A;     SECTION      D & C HYSTEROSCOPY N/A 2022    Procedure: DILATATION AND CURETTAGE HYSTEROSCOPY WITH MYOSURE,POLYPECTOMY;  Surgeon: Serina Moreno MD;  Location: [facility];  Service: Obstetrics/Gynecology;  Laterality: N/A;    MINI-LAPAROTOMY  2020    subcutaneous endometrioma     Social History     Socioeconomic History    Marital status:      Spouse name: Jillian   Tobacco Use    Smoking status: Never    Smokeless tobacco: Never   Vaping Use    Vaping status: Never Used   Substance and Sexual Activity    Alcohol use: Never    Drug use: Never    Sexual activity: Yes     Partners: Male     Family History   Problem Relation Age of Onset    Thyroid disease Mother     Diabetes Father     Thyroid disease Sister     Malig Hyperthermia Neg Hx       Allergies   Allergen Reactions    Pork-Derived Products Other (See Comments)     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 60 capsule 1    glucose blood test strip Take blood sugar fasting (AM) and 2 hours after start of breakfast, lunch and dinner 100 each 12    glucose monitor monitoring kit Use 1 each As Needed (take fasting (AM) and 2 hours after start of each meal). Take fasting and 2 hours after start of breakfast, lunch, dinner 1 each 0    indomethacin (INDOCIN) 50 MG capsule Take 1 capsule by mouth 3 times a day. 6 capsule 0    Insulin Glargine (LANTUS SOLOSTAR) 100 UNIT/ML injection pen Inject 8 Units under the skin into the appropriate area as directed Every Night. 3 mL 1    Insulin Pen Needle (BD Pen Needle Yahaira 2nd Gen) 32G X 4 MM misc Use 1 Device Daily. 100 each 1    Lancet Device misc Take blood sugar fasting and 2 hours after breakfast, lunch, dinner 1 each 12    metFORMIN ER (GLUCOPHAGE-XR) 500 MG 24 hr tablet Take 1 tablet by mouth Daily With Breakfast & Dinner. 60 tablet 3    Prenatal Vit-Fe Fumarate-FA (Prenatal Vitamin) 27-0.8 MG tablet Take 1 tablet by mouth Daily. 90 tablet 4    promethazine (PHENERGAN) 12.5 MG tablet Take 1 tablet by mouth Every 6 (Six) Hours As Needed for Nausea or Vomiting. 30 tablet 0    azithromycin (Zithromax Z-José) 250 MG tablet Take 2 tablets (500 mg) by mouth on Day 1, followed by 1 tablet (250 mg) once daily on Days 2 through 5. (Patient not taking: Reported on 4/30/2024) 6 tablet 0    Progesterone (Prometrium) 200 MG capsule Insert 1 capsule into the vagina Daily. (Patient not taking: Reported on 7/3/2024) 90 capsule 2     No current facility-administered medications on file prior to visit.        Past obstetric, gynecological, medical, surgical, family and social history reviewed.  Relevant lab work and imaging reviewed.    Review of systems  Constitutional:  denies fever, chills, malaise.   ENT/Mouth:  denies sore throat, tinnitus  Eyes: denies vision changes/pain  CV:  denies chest pain  Respiratory:  denies cough/SOB  GI:  denies N/V,  "diarrhea, abdominal pain.    :   denies dysuria  Skin:  denies lesions or pruritus   Neuro:  denies weakness, focal neurologic symptoms    Vitals:    24 1113   BP: 118/72   BP Location: Right arm   Patient Position: Sitting   Pulse: 100   Temp: 98.2 °F (36.8 °C)   TempSrc: Temporal   Weight: 80.3 kg (177 lb)   Height: 167.6 cm (66\")       PHYSICAL EXAM   GENERAL: Not in acute distress, AAOx3, pleasant  CARDIO: regular rate and rhythm  PULM: symmetric chest rise, speaking in complete sentences without difficulty  NEURO: awake, alert and oriented to person, place, and time  ABDOMINAL: No fundal tenderness, no rebound or guarding, gravid  EXTREMITIES: no bilateral lower extremity edema/tenderness  SKIN: Warm, well-perfused      ULTRASOUND   Please view full ultrasound note on Imaging tab in [employer].  Cephalic presentation  Posterior fundal placenta  JOSE A 19cm, which is normal  EFW 2230 g (38%, AC 34%)  BPP 8/      ASSESSMENT/COUNSELIN y.o. G 2 P 0101 at 34 0/7 weeks gestation (Estimated Date of Delivery: 24)    -Pregnancy  [ X ] stable  [   ] improving [  ] worsening    Diagnoses and all orders for this visit:    1. Cervical cerclage suture present in third trimester (Primary)    2. Insulin controlled gestational diabetes mellitus (GDM) in third trimester    3. History of classical  section         GDMA2  Counseling: I explained to her that patients with diabetes have a variety of pregnancy related risks that are related to their level of glycemic control.  We discussed  the increased risks related to diabetes including but not limited to congenital anomalies, fetal growth disorders (FGR or LGA), indicated or spontaneous  birth, macrosomia, polyhydramnios, birth trauma,  complications (jaundice, hypoglycemia, NICU admit) and stillbirth with uncontrolled glucose.       We reviewed the results of her glycemic profiling, which reveals okay but suboptimal control with her current " regimen.  She denies lows.  She had insulin ordered prior to this appt but she never started it or the metformin. States she has it at home and will start today.     New regimen:  Metformin 500 mg  XR PO BID  Lantus      We discussed diet recommendations, and goal pre-prandial values below 90 and 1-hour post-prandial values below 120 to optimize her pregnancy outcome as many studies have demonstrated that this is the normal range for pregnant women without diabetes.We discussed meal timing and eating every 2-3 hours with 30g carbs at breakfast, 45 for lunch and dinner, and 10-15g snacks in between and at bedtime.  We also discussed insulin teaching and hypoglycemia management and to call with BG <60 or >200.     Pt eats breakfast at 10 am, lunch at 3 or 4 pm and dinner at 10-11 pm. Bedtime is at midnight.  Discussed with patient that she needs to be eating snacks between meals and if dinner is more than an hour before bedtime, needs to have bedtime snack.  Fasting blood sugar should be checked after fasting 8-9 hours but no more than 10 hours.   Pt states understanding.      Summary of Plan  - Recommend emailing/fax/submitting her log weekly for review; to bring log to all MFM office visits.  -Continue ADA diet with carbohydrate counting.    -Continue glucose monitoring 7 times daily as instructed.  - Medications as above.  -Serial growth ultrasounds every 4 weeks (MFM)  -Continue progesterone until 36 weeks  - Cerclage to be removed at 36 weeks  -Reviewed modified activity  -Starting at 32 weeks: Weekly fetal  surveillance until delivery (Alternate between MFM and Primary OB)  -Starting at 28 weeks: Fetal movement instructions given continue daily until delivery; instructed to report to labor and delivery if cannot achieve more than 10 kicks in one hour or if she perceives a decrease in fetal movement  -Continue routine prenatal care with primary ob team   -Recommend delivery at 36-37 weeks for h/o  classical  and needs cerclage removed also.   Scheduled for 2024    Follow-up: 2 weeks      Thank you for the consult and opportunity to care for this patient.  Please feel free to reach out with any questions or concerns.      I spent 30 minutes caring for this patient on this date of service. This time includes time spent by me in the following activities: preparing for the visit, reviewing tests, obtaining and/or reviewing a separately obtained history, performing a medically appropriate examination and/or evaluation, counseling and educating the patient/family/caregiver and independently interpreting results and communicating that information with the patient/family/caregiver with greater than 50% spent in counseling and coordination of care.     REED Cueva  Maternal Fetal Medicine-[facility]  Office: [phone]  [email or web address]

## 2024-08-06 NOTE — PROGRESS NOTES
Diabetic education folder provided at time of appointment including blood sugar logs, snack guide, hypoglycemic protocol, and education packet. Iain reports she is doing well with checking 7x daily but typically does not eat dinner or a bedtime snack so she is worried about taking prescribed insulin and Metformin and going low. Encouraged Iain to avoid skipping meals and eat bedtime snack to prevent lows in blood sugar throughout the night. Iain reports she will start insulin tonight. Discuss risks to baby with uncontrolled blood sugar values. No additional questions at this time. Blood sugar log on chart for MFM to review.

## 2024-08-06 NOTE — LETTER
2024     Serina Moreno MD  [address]  [address]  [place] [id number]    Patient: Iain Parkinson   YOB: 1995   Date of Visit: 2024       Dear Serina Moreno MD    Iain Parkinson was in my office today. Below is a copy of my note.    If you have questions, please do not hesitate to call me. I look forward to following Iain along with you.         Sincerely,        REED Watson        CC: No Recipients                            Pt reports that she is doing well and denies vaginal bleeding, cramping, contractions or LOF at this time. Reports active fetal movement. Reviewed when to call OB office or present to L&D for evaluation with symptoms such as decreased fetal movement, vaginal bleeding, LOF or ctxs. Pt verbalized understanding. Denies HA, visual changes or epigastric pain. Denies any additional complaints at time of appointment. Next OB appointment scheduled for 2024. Patient brought in blood sugar logs. Blood sugar is 94.    Vitals:    24 1113   BP: 118/72   Pulse: 100   Temp: 98.2 °F (36.8 °C)          MATERNAL FETAL MEDICINE CONSULT Note    Dear Dr Serina Moreno MD:    Thank you for your kind referral of Iain Parkinson.  As you know, she is a 28 y.o. G 2 P 0101 at 34 0/7 weeks gestation (Estimated Date of Delivery: 24). This is a consult.     Her antepartum course is complicated by:  H/O  delivery at 26 weeks  Cervical incompetence, s/p cerclage 24  GDMA2  H/O Classical     Aneuploidy Screening: MaterniT 21 - Negative    HPI: Today, she denies headache, blurry vision, RUQ pain. No vaginal bleeding, no contractions.     Review of History:  Past Medical History:   Diagnosis Date   • Endometriosis    • Seasonal allergies    • Uterine polyp      Past Surgical History:   Procedure Laterality Date   • CERVICAL CERCLAGE N/A 2024    Procedure: CERVICAL CERCLAGE;  Surgeon: Rosaline Roach MD;  Location: [facility] MAIN OR;   Service: Obstetrics;  Laterality: N/A;   •  SECTION     • D & C HYSTEROSCOPY N/A 2022    Procedure: DILATATION AND CURETTAGE HYSTEROSCOPY WITH MYOSURE,POLYPECTOMY;  Surgeon: Serina Moreno MD;  Location: [facility] OR;  Service: Obstetrics/Gynecology;  Laterality: N/A;   • MINI-LAPAROTOMY  2020    subcutaneous endometrioma     Social History     Socioeconomic History   • Marital status:      Spouse name: Jillian   Tobacco Use   • Smoking status: Never   • Smokeless tobacco: Never   Vaping Use   • Vaping status: Never Used   Substance and Sexual Activity   • Alcohol use: Never   • Drug use: Never   • Sexual activity: Yes     Partners: Male     Family History   Problem Relation Age of Onset   • Thyroid disease Mother    • Diabetes Father    • Thyroid disease Sister    • Malig Hyperthermia Neg Hx       Allergies   Allergen Reactions   • Pork-Derived Products Other (See Comments)     Patient does not eat pork products due to [employer]        Current Outpatient Medications on File Prior to Visit   Medication Sig Dispense Refill   • docusate sodium (Colace) 100 MG capsule Take 1 capsule by mouth 2 (Two) Times a Day. 60 capsule 1   • glucose blood test strip Take blood sugar fasting (AM) and 2 hours after start of breakfast, lunch and dinner 100 each 12   • glucose monitor monitoring kit Use 1 each As Needed (take fasting (AM) and 2 hours after start of each meal). Take fasting and 2 hours after start of breakfast, lunch, dinner 1 each 0   • indomethacin (INDOCIN) 50 MG capsule Take 1 capsule by mouth 3 times a day. 6 capsule 0   • Insulin Glargine (LANTUS SOLOSTAR) 100 UNIT/ML injection pen Inject 8 Units under the skin into the appropriate area as directed Every Night. 3 mL 1   • Insulin Pen Needle (BD Pen Needle Yahaira 2nd Gen) 32G X 4 MM misc Use 1 Device Daily. 100 each 1   • Lancet Device misc Take blood sugar fasting and 2 hours after breakfast, lunch, dinner 1 each 12   • metFORMIN ER  "(GLUCOPHAGE-XR) 500 MG 24 hr tablet Take 1 tablet by mouth Daily With Breakfast & Dinner. 60 tablet 3   • Prenatal Vit-Fe Fumarate-FA (Prenatal Vitamin) 27-0.8 MG tablet Take 1 tablet by mouth Daily. 90 tablet 4   • promethazine (PHENERGAN) 12.5 MG tablet Take 1 tablet by mouth Every 6 (Six) Hours As Needed for Nausea or Vomiting. 30 tablet 0   • azithromycin (Zithromax Z-José) 250 MG tablet Take 2 tablets (500 mg) by mouth on Day 1, followed by 1 tablet (250 mg) once daily on Days 2 through 5. (Patient not taking: Reported on 4/30/2024) 6 tablet 0   • Progesterone (Prometrium) 200 MG capsule Insert 1 capsule into the vagina Daily. (Patient not taking: Reported on 7/3/2024) 90 capsule 2     No current facility-administered medications on file prior to visit.        Past obstetric, gynecological, medical, surgical, family and social history reviewed.  Relevant lab work and imaging reviewed.    Review of systems  Constitutional:  denies fever, chills, malaise.   ENT/Mouth:  denies sore throat, tinnitus  Eyes: denies vision changes/pain  CV:  denies chest pain  Respiratory:  denies cough/SOB  GI:  denies N/V, diarrhea, abdominal pain.    :   denies dysuria  Skin:  denies lesions or pruritus   Neuro:  denies weakness, focal neurologic symptoms    Vitals:    08/06/24 1113   BP: 118/72   BP Location: Right arm   Patient Position: Sitting   Pulse: 100   Temp: 98.2 °F (36.8 °C)   TempSrc: Temporal   Weight: 80.3 kg (177 lb)   Height: 167.6 cm (66\")       PHYSICAL EXAM   GENERAL: Not in acute distress, AAOx3, pleasant  CARDIO: regular rate and rhythm  PULM: symmetric chest rise, speaking in complete sentences without difficulty  NEURO: awake, alert and oriented to person, place, and time  ABDOMINAL: No fundal tenderness, no rebound or guarding, gravid  EXTREMITIES: no bilateral lower extremity edema/tenderness  SKIN: Warm, well-perfused      ULTRASOUND   Please view full ultrasound note on Imaging tab in " ViewPoint.  Cephalic presentation  Posterior fundal placenta  JOSE A 19cm, which is normal  EFW 2230 g (38%, AC 34%)  BPP       ASSESSMENT/COUNSELIN y.o. G 2 P 0101 at 34 0/7 weeks gestation (Estimated Date of Delivery: 24)    -Pregnancy  [ X ] stable  [   ] improving [  ] worsening    Diagnoses and all orders for this visit:    1. Cervical cerclage suture present in third trimester (Primary)    2. Insulin controlled gestational diabetes mellitus (GDM) in third trimester    3. History of classical  section         GDMA2  Counseling: I explained to her that patients with diabetes have a variety of pregnancy related risks that are related to their level of glycemic control.  We discussed  the increased risks related to diabetes including but not limited to congenital anomalies, fetal growth disorders (FGR or LGA), indicated or spontaneous  birth, macrosomia, polyhydramnios, birth trauma,  complications (jaundice, hypoglycemia, NICU admit) and stillbirth with uncontrolled glucose.       We reviewed the results of her glycemic profiling, which reveals okay but suboptimal control with her current regimen.  She denies lows.  She had insulin ordered prior to this appt but she never started it or the metformin. States she has it at home and will start today.     New regimen:  Metformin 500 mg  XR PO BID  Lantus      We discussed diet recommendations, and goal pre-prandial values below 90 and 1-hour post-prandial values below 120 to optimize her pregnancy outcome as many studies have demonstrated that this is the normal range for pregnant women without diabetes.We discussed meal timing and eating every 2-3 hours with 30g carbs at breakfast, 45 for lunch and dinner, and 10-15g snacks in between and at bedtime.  We also discussed insulin teaching and hypoglycemia management and to call with BG <60 or >200.     Pt eats breakfast at 10 am, lunch at 3 or 4 pm and dinner at 10-11 pm. Bedtime  is at midnight.  Discussed with patient that she needs to be eating snacks between meals and if dinner is more than an hour before bedtime, needs to have bedtime snack.  Fasting blood sugar should be checked after fasting 8-9 hours but no more than 10 hours.   Pt states understanding.      Summary of Plan  - Recommend emailing/fax/submitting her log weekly for review; to bring log to all [facility] office visits.  -Continue ADA diet with carbohydrate counting.    -Continue glucose monitoring 7 times daily as instructed.  - Medications as above.  -Serial growth ultrasounds every 4 weeks ([facility])  -Continue progesterone until 36 weeks  - Cerclage to be removed at 36 weeks  -Reviewed modified activity  -Starting at 32 weeks: Weekly fetal  surveillance until delivery (Alternate between M and Primary OB)  -Starting at 28 weeks: Fetal movement instructions given continue daily until delivery; instructed to report to labor and delivery if cannot achieve more than 10 kicks in one hour or if she perceives a decrease in fetal movement  -Continue routine prenatal care with primary ob team   -Recommend delivery at 36-37 weeks for h/o classical  and needs cerclage removed also.   Scheduled for 2024    Follow-up: 2 weeks      Thank you for the consult and opportunity to care for this patient.  Please feel free to reach out with any questions or concerns.      I spent 30 minutes caring for this patient on this date of service. This time includes time spent by me in the following activities: preparing for the visit, reviewing tests, obtaining and/or reviewing a separately obtained history, performing a medically appropriate examination and/or evaluation, counseling and educating the patient/family/caregiver and independently interpreting results and communicating that information with the patient/family/caregiver with greater than 50% spent in counseling and coordination of care.     REED Cueva  Maternal  Fetal Medicine-[facility]  Office: [phone]  [email or web address]      Diabetic education folder provided at time of appointment including blood sugar logs, snack guide, hypoglycemic protocol, and education packet. Iain reports she is doing well with checking 7x daily but typically does not eat dinner or a bedtime snack so she is worried about taking prescribed insulin and Metformin and going low. Encouraged Iain to avoid skipping meals and eat bedtime snack to prevent lows in blood sugar throughout the night. Iain reports she will start insulin tonight. Discuss risks to baby with uncontrolled blood sugar values. No additional questions at this time. Blood sugar log on chart for MFM to review.

## 2024-08-07 ENCOUNTER — TRANSCRIBE ORDERS (OUTPATIENT)
Dept: ULTRASOUND IMAGING | Facility: HOSPITAL | Age: 29
End: 2024-08-07
Payer: COMMERCIAL

## 2024-08-07 DIAGNOSIS — O24.419 GESTATIONAL DIABETES MELLITUS (GDM), ANTEPARTUM, GESTATIONAL DIABETES METHOD OF CONTROL UNSPECIFIED: Primary | ICD-10-CM

## 2024-08-07 DIAGNOSIS — Z87.51 HISTORY OF PRETERM LABOR: ICD-10-CM

## 2024-08-14 ENCOUNTER — ROUTINE PRENATAL (OUTPATIENT)
Dept: OBSTETRICS AND GYNECOLOGY | Facility: CLINIC | Age: 29
End: 2024-08-14
Payer: COMMERCIAL

## 2024-08-14 VITALS — DIASTOLIC BLOOD PRESSURE: 74 MMHG | SYSTOLIC BLOOD PRESSURE: 108 MMHG | WEIGHT: 177 LBS | BODY MASS INDEX: 28.57 KG/M2

## 2024-08-14 DIAGNOSIS — O34.33 CERVICAL CERCLAGE SUTURE PRESENT IN THIRD TRIMESTER: ICD-10-CM

## 2024-08-14 DIAGNOSIS — O24.414 INSULIN CONTROLLED GESTATIONAL DIABETES MELLITUS (GDM) IN THIRD TRIMESTER: ICD-10-CM

## 2024-08-14 DIAGNOSIS — Z3A.35 35 WEEKS GESTATION OF PREGNANCY: Primary | ICD-10-CM

## 2024-08-14 LAB
GLUCOSE UR STRIP-MCNC: NEGATIVE MG/DL
PROT UR STRIP-MCNC: NEGATIVE MG/DL

## 2024-08-14 RX ORDER — METFORMIN HYDROCHLORIDE 500 MG/1
500 TABLET, EXTENDED RELEASE ORAL
Qty: 60 TABLET | Refills: 3 | Status: SHIPPED | OUTPATIENT
Start: 2024-08-14

## 2024-08-14 NOTE — PROGRESS NOTES
Chief Complaint   Patient presents with    Routine Prenatal Visit     35 weeks       Iain Parkinson is a 28 y.o.  at 35w1d  here for routine OB visit  Reports no issues. She did leave her metformin at a restaurant and called to see if it was there but has not had it in a few days.     Blood sugars reviewed and fasting are >90 aside from 1.     /74   Wt 80.3 kg (177 lb)   LMP 10/26/2023   BMI 28.57 kg/m²    Fetal Heart Rate: +  Movement: Present  Presentation: Vertex   Gen: NAD, well appearing  Abd: nontender    See OB Flowsheet    ASSESSMENT/PLAN:  Diagnoses and all orders for this visit:    1. 35 weeks gestation of pregnancy (Primary)  -     POC Urinalysis Dipstick    2. Cervical cerclage suture present in third trimester    3. Insulin controlled gestational diabetes mellitus (GDM) in third trimester    Other orders  -     metFORMIN ER (GLUCOPHAGE-XR) 500 MG 24 hr tablet; Take 1 tablet by mouth Daily With Breakfast & Dinner.  Dispense: 60 tablet; Refill: 3    Will increase QHS Insulin to Lantus 10.  Keep AM at 5.   Continue Metformin, new Rx sent  Has MFM follow up next week  CS planned for Thursday with cerclage removal at that time    Return in about 2 weeks (around 2024) for postop visit .

## 2024-08-15 ENCOUNTER — DOCUMENTATION (OUTPATIENT)
Dept: OBSTETRICS AND GYNECOLOGY | Facility: CLINIC | Age: 29
End: 2024-08-15
Payer: COMMERCIAL

## 2024-08-15 NOTE — PROGRESS NOTES
Weekly MFM glucose log evaluation. The following adjustments were made:    Lantus: 7/10  Metformin: 500mg BID    MFM will review glucose logs again on 8/20.

## 2024-08-20 ENCOUNTER — OFFICE VISIT (OUTPATIENT)
Dept: OBSTETRICS AND GYNECOLOGY | Facility: CLINIC | Age: 29
End: 2024-08-20
Payer: COMMERCIAL

## 2024-08-20 ENCOUNTER — HOSPITAL ENCOUNTER (OUTPATIENT)
Dept: ULTRASOUND IMAGING | Facility: HOSPITAL | Age: 29
Discharge: HOME | End: 2024-08-20
Admitting: OBSTETRICS & GYNECOLOGY
Payer: COMMERCIAL

## 2024-08-20 VITALS
HEIGHT: 66 IN | OXYGEN SATURATION: 98 % | WEIGHT: 179 LBS | TEMPERATURE: 98 F | SYSTOLIC BLOOD PRESSURE: 114 MMHG | DIASTOLIC BLOOD PRESSURE: 75 MMHG | HEART RATE: 108 BPM | BODY MASS INDEX: 28.77 KG/M2

## 2024-08-20 DIAGNOSIS — O24.419 GESTATIONAL DIABETES MELLITUS (GDM), ANTEPARTUM, GESTATIONAL DIABETES METHOD OF CONTROL UNSPECIFIED: ICD-10-CM

## 2024-08-20 DIAGNOSIS — Z87.51 HISTORY OF PRETERM LABOR: ICD-10-CM

## 2024-08-20 LAB — GLUCOSE BLDC GLUCOMTR-MCNC: 80 MG/DL (ref 70–130)

## 2024-08-20 PROCEDURE — 76819 FETAL BIOPHYS PROFIL W/O NST: CPT

## 2024-08-20 PROCEDURE — 76819 FETAL BIOPHYS PROFIL W/O NST: CPT | Performed by: OBSTETRICS & GYNECOLOGY

## 2024-08-20 PROCEDURE — 82948 REAGENT STRIP/BLOOD GLUCOSE: CPT

## 2024-08-20 NOTE — LETTER
2024     Serina Moreno MD  [address]  [address]  [facility] [address]    Patient: Iain Parkinson   YOB: 1995   Date of Visit: 2024       Dear Serina Moreno MD    Iain Parkinson was in my office today. Below is a copy of my note.    If you have questions, please do not hesitate to call me. I look forward to following Iain along with you.         Sincerely,        REED Watson        CC: No Recipients                          MATERNAL FETAL MEDICINE CONSULT Note    Dear Dr Serina Moreno MD:    Thank you for your kind referral of Iain Parkinson.  As you know, she is a 28 y.o. G 2 P 0101 at 36 0/7 weeks gestation (Estimated Date of Delivery: 24). This is a consult.     Her antepartum course is complicated by:  H/O  delivery at 26 weeks  Cervical incompetence, s/p cerclage 24  GDMA2  H/O Classical     Aneuploidy Screening: MaterniT 21 - Negative    HPI: Today, she denies headache, blurry vision, RUQ pain. No vaginal bleeding, no contractions.     Review of History:  Past Medical History:   Diagnosis Date   • Endometriosis    • Seasonal allergies    • Uterine polyp      Past Surgical History:   Procedure Laterality Date   • CERVICAL CERCLAGE N/A 2024    Procedure: CERVICAL CERCLAGE;  Surgeon: Rosaline Roach MD;  Location: [facility];  Service: Obstetrics;  Laterality: N/A;   •  SECTION     • D & C HYSTEROSCOPY N/A 2022    Procedure: DILATATION AND CURETTAGE HYSTEROSCOPY WITH MYOSURE,POLYPECTOMY;  Surgeon: Serina Moreno MD;  Location: [facility];  Service: Obstetrics/Gynecology;  Laterality: N/A;   • MINI-LAPAROTOMY  2020    subcutaneous endometrioma     Social History     Socioeconomic History   • Marital status:      Spouse name: Jillian   Tobacco Use   • Smoking status: Never   • Smokeless tobacco: Never   Vaping Use   • Vaping status: Never Used   Substance and Sexual Activity   • Alcohol use: Never    • Drug use: Never   • Sexual activity: Yes     Partners: Male     Family History   Problem Relation Age of Onset   • Thyroid disease Mother    • Diabetes Father    • Thyroid disease Sister    • Malig Hyperthermia Neg Hx       Allergies   Allergen Reactions   • Pork-Derived Products Other (See Comments)     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 Take fasting and 2 hours after start of breakfast, lunch, dinner 1 each 0   • Insulin Glargine (LANTUS SOLOSTAR) 100 UNIT/ML injection pen Inject 8 Units under the skin into the appropriate area as directed Every Night. (Patient taking differently: Inject 8 Units under the skin into the appropriate area as directed 2 (Two) Times a Day. 7 AM/ 10 PM) 3 mL 1   • Insulin Pen Needle (BD Pen Needle Yahaira 2nd Gen) 32G X 4 MM misc Use 1 Device Daily. 100 each 1   • Lancet Device misc Take blood sugar fasting and 2 hours after breakfast, lunch, dinner 1 each 12   • metFORMIN ER (GLUCOPHAGE-XR) 500 MG 24 hr tablet Take 1 tablet by mouth Daily With Breakfast & Dinner. 60 tablet 3   • Prenatal Vit-Fe Fumarate-FA (Prenatal Vitamin) 27-0.8 MG tablet Take 1 tablet by mouth Daily. 90 tablet 4   • Progesterone (Prometrium) 200 MG capsule Insert 1 capsule into the vagina Daily. 90 capsule 2   • docusate sodium (Colace) 100 MG capsule Take 1 capsule by mouth 2 (Two) Times a Day. (Patient not taking: Reported on 8/20/2024) 60 capsule 1   • promethazine (PHENERGAN) 12.5 MG tablet Take 1 tablet by mouth Every 6 (Six) Hours As Needed for Nausea or Vomiting. (Patient not taking: Reported on 8/20/2024) 30 tablet 0   • [DISCONTINUED] azithromycin (Zithromax  "Z-José) 250 MG tablet Take 2 tablets (500 mg) by mouth on Day 1, followed by 1 tablet (250 mg) once daily on Days 2 through 5. 6 tablet 0   • [DISCONTINUED] indomethacin (INDOCIN) 50 MG capsule Take 1 capsule by mouth 3 times a day. 6 capsule 0     No current facility-administered medications on file prior to visit.      Past obstetric, gynecological, medical, surgical, family and social history reviewed.  Relevant lab work and imaging reviewed.    Review of systems  Constitutional:  denies fever, chills, malaise.   ENT/Mouth:  denies sore throat, tinnitus  Eyes: denies vision changes/pain  CV:  denies chest pain  Respiratory:  denies cough/SOB  GI:  denies N/V, diarrhea, abdominal pain.    :   denies dysuria  Skin:  denies lesions or pruritus   Neuro:  denies weakness, focal neurologic symptoms    Vitals:    08/20/24 1526   BP: 114/75   BP Location: Right arm   Patient Position: Sitting   Pulse: 108   Temp: 98 °F (36.7 °C)   TempSrc: Temporal   SpO2: 98%   Weight: 81.2 kg (179 lb)   Height: 167.6 cm (66\")     PHYSICAL EXAM   GENERAL: Not in acute distress, AAOx3, pleasant  CARDIO: regular rate and rhythm  PULM: symmetric chest rise, speaking in complete sentences without difficulty  NEURO: awake, alert and oriented to person, place, and time  ABDOMINAL: No fundal tenderness, no rebound or guarding, gravid  EXTREMITIES: no bilateral lower extremity edema/tenderness  SKIN: Warm, well-perfused      ULTRASOUND   Please view full ultrasound note on Imaging tab in ViewPoint.  Cephalic presentation  Posterior fundal placenta  JOSE A 19cm, which is normal  BPP 6/8 (-2 for breathing)      NST  DATE: August 20, 2024  PROCEDURE: nonstress test  INTERPRETING PROVIDER: REED Cueva  GESTATIONAL AGE: 36w0d gestation  DIAGNOSIS: GDMA2  INDICATION: BPP 6/8 (-2 for breathing)  START TIME: 15:38  END TIME: 15:59  FINDINGS: NST: 135 bpm, reactive, moderate variability (amplitude 6-25 bpm), present, no decelerations TOCOMETRY: " irritability  *Patient was monitored for a minimum of 20 minutes.    ASSESSMENT/COUNSELIN y.o. G 2 P 0101 at 36 0/7 weeks gestation (Estimated Date of Delivery: 24)    -Pregnancy  [ X ] stable  [   ] improving [  ] worsening    Diagnoses and all orders for this visit:    1. Gestational diabetes mellitus (GDM) in childbirth, insulin controlled (Primary)         GDMA2  Counseling: I explained to her that patients with diabetes have a variety of pregnancy related risks that are related to their level of glycemic control.  We discussed  the increased risks related to diabetes including but not limited to congenital anomalies, fetal growth disorders (FGR or LGA), indicated or spontaneous  birth, macrosomia, polyhydramnios, birth trauma,  complications (jaundice, hypoglycemia, NICU admit) and stillbirth with uncontrolled glucose.       We reviewed the results of her glycemic profiling, which reveals okay but suboptimal control with her current regimen.  She denies lows.  She had insulin ordered prior to this appt but she never started it or the metformin. States she has it at home and will start today.     New regimen:  Metformin 500 mg  XR PO BID  Lantus 7 / 10     We discussed diet recommendations, and goal pre-prandial values below 90 and 1-hour post-prandial values below 120 to optimize her pregnancy outcome as many studies have demonstrated that this is the normal range for pregnant women without diabetes.We discussed meal timing and eating every 2-3 hours with 30g carbs at breakfast, 45 for lunch and dinner, and 10-15g snacks in between and at bedtime.  We also discussed insulin teaching and hypoglycemia management and to call with BG <60 or >200.           Summary of Plan    -Continue ADA diet with carbohydrate counting.    -Continue glucose monitoring 7 times daily as instructed.  - Medications as above.  -Continue progesterone until 36 weeks  - Cerclage to be removed at 36  weeks  -Reviewed modified activity  -Starting at 28 weeks: Fetal movement instructions given continue daily until delivery; instructed to report to labor and delivery if cannot achieve more than 10 kicks in one hour or if she perceives a decrease in fetal movement  -Continue routine prenatal care with primary ob team   -Recommend delivery at 36-37 weeks for h/o classical  and needs cerclage removed also.   Scheduled for 2024    Follow-up: Delivering on !!! Congratulations and best wishes!   No follow up with MFM scheduled, but I am happy to see for follow up at request of primary obstetrician    Thank you for the consult and opportunity to care for this patient.  Please feel free to reach out with any questions or concerns.      I spent 30 minutes caring for this patient on this date of service. This time includes time spent by me in the following activities: preparing for the visit, reviewing tests, obtaining and/or reviewing a separately obtained history, performing a medically appropriate examination and/or evaluation, counseling and educating the patient/family/caregiver and independently interpreting results and communicating that information with the patient/family/caregiver with greater than 50% spent in counseling and coordination of care.     REED Cueva  Maternal Fetal Medicine-[facility]  Office: [phone]  [email or web address]      Pt reports that she is doing well and denies vaginal bleeding, cramping, contractions or LOF at this time. Reports active fetal movement. Reviewed when to call OB office or present to L&D for evaluation with symptoms such as decreased fetal movement, vaginal bleeding, LOF or ctxs. Pt verbalized understanding. Denies HA, visual changes or epigastric pain. Denies any additional complaints at time of appointment. C/S scheduled !    Blood sugar at time of appointment is 80. No logs to appointment today-reminder sent.     Vitals:     08/20/24 1526   BP: 114/75   Pulse: 108   Temp: 98 °F (36.7 °C)   SpO2: 98%         Reason for test:  BPP 6/8 (-breathing)  Date of Test: 8/20/2024  Time frame of test: 3969-1229  RN NST Interpretation:  Reactive NST. Moderate variability, +accelerations, no decelerations noted. Active fetal movement throughout NST. Reviewed by REED Cueva.

## 2024-08-20 NOTE — PROGRESS NOTES
MATERNAL FETAL MEDICINE CONSULT Note    Dear Dr Serina Moreno MD:    Thank you for your kind referral of Iain Parkinson.  As you know, she is a 28 y.o. G 2 P 0101 at 36 0/7 weeks gestation (Estimated Date of Delivery: 24). This is a consult.     Her antepartum course is complicated by:  H/O  delivery at 26 weeks  Cervical incompetence, s/p cerclage 24  GDMA2  H/O Classical     Aneuploidy Screening: MaterniT 21 - Negative    HPI: Today, she denies headache, blurry vision, RUQ pain. No vaginal bleeding, no contractions.     Review of History:  Past Medical History:   Diagnosis Date    Endometriosis     Seasonal allergies     Uterine polyp      Past Surgical History:   Procedure Laterality Date    CERVICAL CERCLAGE N/A 2024    Procedure: CERVICAL CERCLAGE;  Surgeon: Rosaline Roach MD;  Location: [facility];  Service: Obstetrics;  Laterality: N/A;     SECTION      D & C HYSTEROSCOPY N/A 2022    Procedure: DILATATION AND CURETTAGE HYSTEROSCOPY WITH MYOSURE,POLYPECTOMY;  Surgeon: Serina Moreno MD;  Location: [facility];  Service: Obstetrics/Gynecology;  Laterality: N/A;    MINI-LAPAROTOMY  2020    subcutaneous endometrioma     Social History     Socioeconomic History    Marital status:      Spouse name: Jillian   Tobacco Use    Smoking status: Never    Smokeless tobacco: Never   Vaping Use    Vaping status: Never Used   Substance and Sexual Activity    Alcohol use: Never    Drug use: Never    Sexual activity: Yes     Partners: Male     Family History   Problem Relation Age of Onset    Thyroid disease Mother     Diabetes Father     Thyroid disease Sister     Malig Hyperthermia Neg Hx       Allergies   Allergen Reactions    Pork-Derived Products Other (See Comments)     Patient does not eat pork products due to [employer]        Current Outpatient Medications on File Prior to Visit   Medication Sig Dispense Refill    glucose blood test strip Take blood sugar  fasting (AM) and 2 hours after start of breakfast, lunch and dinner 100 each 12    glucose monitor monitoring kit Use 1 each As Needed (take fasting (AM) and 2 hours after start of each meal). Take fasting and 2 hours after start of breakfast, lunch, dinner 1 each 0    Insulin Glargine (LANTUS SOLOSTAR) 100 UNIT/ML injection pen Inject 8 Units under the skin into the appropriate area as directed Every Night. (Patient taking differently: Inject 8 Units under the skin into the appropriate area as directed 2 (Two) Times a Day. 7 AM/ 10 PM) 3 mL 1    Insulin Pen Needle (BD Pen Needle Yahaira 2nd Gen) 32G X 4 MM misc Use 1 Device Daily. 100 each 1    Lancet Device misc Take blood sugar fasting and 2 hours after breakfast, lunch, dinner 1 each 12    metFORMIN ER (GLUCOPHAGE-XR) 500 MG 24 hr tablet Take 1 tablet by mouth Daily With Breakfast & Dinner. 60 tablet 3    Prenatal Vit-Fe Fumarate-FA (Prenatal Vitamin) 27-0.8 MG tablet Take 1 tablet by mouth Daily. 90 tablet 4    Progesterone (Prometrium) 200 MG capsule Insert 1 capsule into the vagina Daily. 90 capsule 2    docusate sodium (Colace) 100 MG capsule Take 1 capsule by mouth 2 (Two) Times a Day. (Patient not taking: Reported on 8/20/2024) 60 capsule 1    promethazine (PHENERGAN) 12.5 MG tablet Take 1 tablet by mouth Every 6 (Six) Hours As Needed for Nausea or Vomiting. (Patient not taking: Reported on 8/20/2024) 30 tablet 0    [DISCONTINUED] azithromycin (Zithromax Z-José) 250 MG tablet Take 2 tablets (500 mg) by mouth on Day 1, followed by 1 tablet (250 mg) once daily on Days 2 through 5. 6 tablet 0    [DISCONTINUED] indomethacin (INDOCIN) 50 MG capsule Take 1 capsule by mouth 3 times a day. 6 capsule 0     No current facility-administered medications on file prior to visit.      Past obstetric, gynecological, medical, surgical, family and social history reviewed.  Relevant lab work and imaging reviewed.    Review of systems  Constitutional:  denies fever, chills,  "malaise.   ENT/Mouth:  denies sore throat, tinnitus  Eyes: denies vision changes/pain  CV:  denies chest pain  Respiratory:  denies cough/SOB  GI:  denies N/V, diarrhea, abdominal pain.    :   denies dysuria  Skin:  denies lesions or pruritus   Neuro:  denies weakness, focal neurologic symptoms    Vitals:    24 1526   BP: 114/75   BP Location: Right arm   Patient Position: Sitting   Pulse: 108   Temp: 98 °F (36.7 °C)   TempSrc: Temporal   SpO2: 98%   Weight: 81.2 kg (179 lb)   Height: 167.6 cm (66\")     PHYSICAL EXAM   GENERAL: Not in acute distress, AAOx3, pleasant  CARDIO: regular rate and rhythm  PULM: symmetric chest rise, speaking in complete sentences without difficulty  NEURO: awake, alert and oriented to person, place, and time  ABDOMINAL: No fundal tenderness, no rebound or guarding, gravid  EXTREMITIES: no bilateral lower extremity edema/tenderness  SKIN: Warm, well-perfused      ULTRASOUND   Please view full ultrasound note on Imaging tab in ViewPoint.  Cephalic presentation  Posterior fundal placenta  JOSE A 19cm, which is normal  BPP 6/8 (-2 for breathing)      NST  DATE: 2024  PROCEDURE: nonstress test  INTERPRETING PROVIDER: REED Cueva  GESTATIONAL AGE: 36w0d gestation  DIAGNOSIS: GDMA2  INDICATION: BPP 6/8 (-2 for breathing)  START TIME: 15:38  END TIME: 15:59  FINDINGS: NST: 135 bpm, reactive, moderate variability (amplitude 6-25 bpm), present, no decelerations TOCOMETRY: irritability  *Patient was monitored for a minimum of 20 minutes.    ASSESSMENT/COUNSELIN y.o. G 2 P 0101 at 36 0/7 weeks gestation (Estimated Date of Delivery: 24)    -Pregnancy  [ X ] stable  [   ] improving [  ] worsening    Diagnoses and all orders for this visit:    1. Gestational diabetes mellitus (GDM) in childbirth, insulin controlled (Primary)         GDMA2  Counseling: I explained to her that patients with diabetes have a variety of pregnancy related risks that are related to their " level of glycemic control.  We discussed  the increased risks related to diabetes including but not limited to congenital anomalies, fetal growth disorders (FGR or LGA), indicated or spontaneous  birth, macrosomia, polyhydramnios, birth trauma,  complications (jaundice, hypoglycemia, NICU admit) and stillbirth with uncontrolled glucose.       We reviewed the results of her glycemic profiling, which reveals okay but suboptimal control with her current regimen.  She denies lows.  She had insulin ordered prior to this appt but she never started it or the metformin. States she has it at home and will start today.     New regimen:  Metformin 500 mg  XR PO BID  Lantus 7 / 10     We discussed diet recommendations, and goal pre-prandial values below 90 and 1-hour post-prandial values below 120 to optimize her pregnancy outcome as many studies have demonstrated that this is the normal range for pregnant women without diabetes.We discussed meal timing and eating every 2-3 hours with 30g carbs at breakfast, 45 for lunch and dinner, and 10-15g snacks in between and at bedtime.  We also discussed insulin teaching and hypoglycemia management and to call with BG <60 or >200.           Summary of Plan    -Continue ADA diet with carbohydrate counting.    -Continue glucose monitoring 7 times daily as instructed.  - Medications as above.  -Continue progesterone until 36 weeks  - Cerclage to be removed at 36 weeks  -Reviewed modified activity  -Starting at 28 weeks: Fetal movement instructions given continue daily until delivery; instructed to report to labor and delivery if cannot achieve more than 10 kicks in one hour or if she perceives a decrease in fetal movement  -Continue routine prenatal care with primary ob team   -Recommend delivery at 36-37 weeks for h/o classical  and needs cerclage removed also.   Scheduled for 2024    Follow-up: Delivering on !!! Congratulations and best wishes!   No  follow up with MFM scheduled, but I am happy to see for follow up at request of primary obstetrician    Thank you for the consult and opportunity to care for this patient.  Please feel free to reach out with any questions or concerns.      I spent 30 minutes caring for this patient on this date of service. This time includes time spent by me in the following activities: preparing for the visit, reviewing tests, obtaining and/or reviewing a separately obtained history, performing a medically appropriate examination and/or evaluation, counseling and educating the patient/family/caregiver and independently interpreting results and communicating that information with the patient/family/caregiver with greater than 50% spent in counseling and coordination of care.     REED Cueva  Maternal Fetal Medicine-[facility]  Office: [phone]  [email or web address]

## 2024-08-20 NOTE — PROGRESS NOTES
Pt reports that she is doing well and denies vaginal bleeding, cramping, contractions or LOF at this time. Reports active fetal movement. Reviewed when to call OB office or present to L&D for evaluation with symptoms such as decreased fetal movement, vaginal bleeding, LOF or ctxs. Pt verbalized understanding. Denies HA, visual changes or epigastric pain. Denies any additional complaints at time of appointment. C/S scheduled 8/22!    Blood sugar at time of appointment is 80. No logs to appointment today-reminder sent.     Vitals:    08/20/24 1526   BP: 114/75   Pulse: 108   Temp: 98 °F (36.7 °C)   SpO2: 98%

## 2024-08-20 NOTE — PROGRESS NOTES
Reason for test:  BPP 6/8 (-breathing)  Date of Test: 8/20/2024  Time frame of test: 1716-3689  RN NST Interpretation:  Reactive NST. Moderate variability, +accelerations, no decelerations noted. Active fetal movement throughout NST. Reviewed by REED Cueva.

## 2024-08-22 ENCOUNTER — HOSPITAL ENCOUNTER (INPATIENT)
Facility: HOSPITAL | Age: 29
LOS: 2 days | Discharge: HOME | End: 2024-08-24
Attending: OBSTETRICS & GYNECOLOGY | Admitting: OBSTETRICS & GYNECOLOGY
Payer: COMMERCIAL

## 2024-08-22 ENCOUNTER — ANESTHESIA EVENT (OUTPATIENT)
Dept: LABOR AND DELIVERY | Facility: HOSPITAL | Age: 29
End: 2024-08-22
Payer: COMMERCIAL

## 2024-08-22 ENCOUNTER — ANESTHESIA (OUTPATIENT)
Dept: LABOR AND DELIVERY | Facility: HOSPITAL | Age: 29
End: 2024-08-22
Payer: COMMERCIAL

## 2024-08-22 DIAGNOSIS — Z98.891 HISTORY OF CLASSICAL CESAREAN SECTION: Primary | ICD-10-CM

## 2024-08-22 PROBLEM — Z34.90 PREGNANCY: Status: RESOLVED | Noted: 2024-08-22 | Resolved: 2024-08-22

## 2024-08-22 PROBLEM — Z34.90 PREGNANCY: Status: ACTIVE | Noted: 2024-08-22

## 2024-08-22 LAB
ABO GROUP BLD: NORMAL
ALBUMIN SERPL-MCNC: 3.6 G/DL (ref 3.5–5.2)
ALBUMIN/GLOB SERPL: 1.1 G/DL
ALP SERPL-CCNC: 116 U/L (ref 39–117)
ALT SERPL W P-5'-P-CCNC: 15 U/L (ref 1–33)
ANION GAP SERPL CALCULATED.3IONS-SCNC: 12.9 MMOL/L (ref 5–15)
AST SERPL-CCNC: 15 U/L (ref 1–32)
B PARAPERT DNA SPEC QL NAA+PROBE: NOT DETECTED
B PERT DNA SPEC QL NAA+PROBE: NOT DETECTED
BASOPHILS # BLD AUTO: 0.03 10*3/MM3 (ref 0–0.2)
BASOPHILS NFR BLD AUTO: 0.4 % (ref 0–1.5)
BILIRUB SERPL-MCNC: 0.3 MG/DL (ref 0–1.2)
BLD GP AB SCN SERPL QL: NEGATIVE
BUN SERPL-MCNC: 5 MG/DL (ref 6–20)
BUN/CREAT SERPL: 10.9 (ref 7–25)
C PNEUM DNA NPH QL NAA+NON-PROBE: NOT DETECTED
CALCIUM SPEC-SCNC: 9 MG/DL (ref 8.6–10.5)
CHLORIDE SERPL-SCNC: 104 MMOL/L (ref 98–107)
CO2 SERPL-SCNC: 21.1 MMOL/L (ref 22–29)
CREAT SERPL-MCNC: 0.46 MG/DL (ref 0.57–1)
DEPRECATED RDW RBC AUTO: 42.6 FL (ref 37–54)
EGFRCR SERPLBLD CKD-EPI 2021: 132.2 ML/MIN/1.73
EOSINOPHIL # BLD AUTO: 0.09 10*3/MM3 (ref 0–0.4)
EOSINOPHIL NFR BLD AUTO: 1.2 % (ref 0.3–6.2)
ERYTHROCYTE [DISTWIDTH] IN BLOOD BY AUTOMATED COUNT: 13 % (ref 12.3–15.4)
FLUAV SUBTYP SPEC NAA+PROBE: NOT DETECTED
FLUBV RNA ISLT QL NAA+PROBE: NOT DETECTED
GLOBULIN UR ELPH-MCNC: 3.3 GM/DL
GLUCOSE BLDC GLUCOMTR-MCNC: 107 MG/DL (ref 70–130)
GLUCOSE BLDC GLUCOMTR-MCNC: 61 MG/DL (ref 70–130)
GLUCOSE SERPL-MCNC: 76 MG/DL (ref 65–99)
HADV DNA SPEC NAA+PROBE: NOT DETECTED
HCOV 229E RNA SPEC QL NAA+PROBE: NOT DETECTED
HCOV HKU1 RNA SPEC QL NAA+PROBE: NOT DETECTED
HCOV NL63 RNA SPEC QL NAA+PROBE: NOT DETECTED
HCOV OC43 RNA SPEC QL NAA+PROBE: NOT DETECTED
HCT VFR BLD AUTO: 36.6 % (ref 34–46.6)
HGB BLD-MCNC: 12.3 G/DL (ref 12–15.9)
HMPV RNA NPH QL NAA+NON-PROBE: NOT DETECTED
HPIV1 RNA ISLT QL NAA+PROBE: NOT DETECTED
HPIV2 RNA SPEC QL NAA+PROBE: NOT DETECTED
HPIV3 RNA NPH QL NAA+PROBE: NOT DETECTED
HPIV4 P GENE NPH QL NAA+PROBE: NOT DETECTED
IMM GRANULOCYTES # BLD AUTO: 0.08 10*3/MM3 (ref 0–0.05)
IMM GRANULOCYTES NFR BLD AUTO: 1 % (ref 0–0.5)
LYMPHOCYTES # BLD AUTO: 2.04 10*3/MM3 (ref 0.7–3.1)
LYMPHOCYTES NFR BLD AUTO: 26.2 % (ref 19.6–45.3)
M PNEUMO IGG SER IA-ACNC: NOT DETECTED
MCH RBC QN AUTO: 30.5 PG (ref 26.6–33)
MCHC RBC AUTO-ENTMCNC: 33.6 G/DL (ref 31.5–35.7)
MCV RBC AUTO: 90.8 FL (ref 79–97)
MONOCYTES # BLD AUTO: 0.62 10*3/MM3 (ref 0.1–0.9)
MONOCYTES NFR BLD AUTO: 7.9 % (ref 5–12)
NEUTROPHILS NFR BLD AUTO: 4.94 10*3/MM3 (ref 1.7–7)
NEUTROPHILS NFR BLD AUTO: 63.3 % (ref 42.7–76)
NRBC BLD AUTO-RTO: 0 /100 WBC (ref 0–0.2)
PLATELET # BLD AUTO: 205 10*3/MM3 (ref 140–450)
PMV BLD AUTO: 9.9 FL (ref 6–12)
POTASSIUM SERPL-SCNC: 3.9 MMOL/L (ref 3.5–5.2)
PROT SERPL-MCNC: 6.9 G/DL (ref 6–8.5)
RBC # BLD AUTO: 4.03 10*6/MM3 (ref 3.77–5.28)
RH BLD: POSITIVE
RHINOVIRUS RNA SPEC NAA+PROBE: NOT DETECTED
RSV RNA NPH QL NAA+NON-PROBE: NOT DETECTED
SARS-COV-2 RNA NPH QL NAA+NON-PROBE: NOT DETECTED
SODIUM SERPL-SCNC: 138 MMOL/L (ref 136–145)
T&S EXPIRATION DATE: NORMAL
TREPONEMA PALLIDUM IGG+IGM AB [PRESENCE] IN SERUM OR PLASMA BY IMMUNOASSAY: NORMAL
WBC NRBC COR # BLD AUTO: 7.8 10*3/MM3 (ref 3.4–10.8)

## 2024-08-22 PROCEDURE — 25010000002 CLONIDINE PER 1 MG: Performed by: OBSTETRICS & GYNECOLOGY

## 2024-08-22 PROCEDURE — 25010000002 ROPIVACAINE PER 1 MG: Performed by: OBSTETRICS & GYNECOLOGY

## 2024-08-22 PROCEDURE — 25810000003 LACTATED RINGERS PER 1000 ML: Performed by: OBSTETRICS & GYNECOLOGY

## 2024-08-22 PROCEDURE — 88307 TISSUE EXAM BY PATHOLOGIST: CPT

## 2024-08-22 PROCEDURE — 25010000002 BUPIVACAINE PF 0.75 % SOLUTION: Performed by: ANESTHESIOLOGY

## 2024-08-22 PROCEDURE — 86900 BLOOD TYPING SEROLOGIC ABO: CPT | Performed by: OBSTETRICS & GYNECOLOGY

## 2024-08-22 PROCEDURE — 85025 COMPLETE CBC W/AUTO DIFF WBC: CPT | Performed by: OBSTETRICS & GYNECOLOGY

## 2024-08-22 PROCEDURE — 25010000002 MORPHINE PER 10 MG: Performed by: ANESTHESIOLOGY

## 2024-08-22 PROCEDURE — 59514 CESAREAN DELIVERY ONLY: CPT | Performed by: OBSTETRICS & GYNECOLOGY

## 2024-08-22 PROCEDURE — 25010000002 EPINEPHRINE 1 MG/ML SOLUTION 30 ML VIAL: Performed by: OBSTETRICS & GYNECOLOGY

## 2024-08-22 PROCEDURE — 86780 TREPONEMA PALLIDUM: CPT | Performed by: OBSTETRICS & GYNECOLOGY

## 2024-08-22 PROCEDURE — 86901 BLOOD TYPING SEROLOGIC RH(D): CPT | Performed by: OBSTETRICS & GYNECOLOGY

## 2024-08-22 PROCEDURE — 0UCC7ZZ EXTIRPATION OF MATTER FROM CERVIX, VIA NATURAL OR ARTIFICIAL OPENING: ICD-10-PCS | Performed by: OBSTETRICS & GYNECOLOGY

## 2024-08-22 PROCEDURE — 25010000002 ONDANSETRON PER 1 MG: Performed by: ANESTHESIOLOGY

## 2024-08-22 PROCEDURE — 25010000002 CEFAZOLIN PER 500 MG: Performed by: OBSTETRICS & GYNECOLOGY

## 2024-08-22 PROCEDURE — 86850 RBC ANTIBODY SCREEN: CPT | Performed by: OBSTETRICS & GYNECOLOGY

## 2024-08-22 PROCEDURE — 25010000002 KETOROLAC TROMETHAMINE PER 15 MG: Performed by: OBSTETRICS & GYNECOLOGY

## 2024-08-22 PROCEDURE — 82948 REAGENT STRIP/BLOOD GLUCOSE: CPT

## 2024-08-22 PROCEDURE — 25010000002 FENTANYL CITRATE (PF) 50 MCG/ML SOLUTION: Performed by: ANESTHESIOLOGY

## 2024-08-22 PROCEDURE — 59510 CESAREAN DELIVERY: CPT | Performed by: OBSTETRICS & GYNECOLOGY

## 2024-08-22 PROCEDURE — 80053 COMPREHEN METABOLIC PANEL: CPT | Performed by: OBSTETRICS & GYNECOLOGY

## 2024-08-22 PROCEDURE — 0202U NFCT DS 22 TRGT SARS-COV-2: CPT | Performed by: OBSTETRICS & GYNECOLOGY

## 2024-08-22 RX ORDER — DIPHENHYDRAMINE HCL 25 MG
25 CAPSULE ORAL EVERY 4 HOURS PRN
Status: DISCONTINUED | OUTPATIENT
Start: 2024-08-22 | End: 2024-08-24 | Stop reason: HOSPADM

## 2024-08-22 RX ORDER — OXYTOCIN/0.9 % SODIUM CHLORIDE 30/500 ML
250 PLASTIC BAG, INJECTION (ML) INTRAVENOUS CONTINUOUS
Status: DISPENSED | OUTPATIENT
Start: 2024-08-22 | End: 2024-08-22

## 2024-08-22 RX ORDER — SIMETHICONE 80 MG
80 TABLET,CHEWABLE ORAL 4 TIMES DAILY PRN
Status: DISCONTINUED | OUTPATIENT
Start: 2024-08-22 | End: 2024-08-24 | Stop reason: HOSPADM

## 2024-08-22 RX ORDER — PHYTONADIONE 1 MG/.5ML
INJECTION, EMULSION INTRAMUSCULAR; INTRAVENOUS; SUBCUTANEOUS
Status: ACTIVE
Start: 2024-08-22 | End: 2024-08-23

## 2024-08-22 RX ORDER — MORPHINE SULFATE 4 MG/ML
INJECTION, SOLUTION INTRAMUSCULAR; INTRAVENOUS
Status: COMPLETED | OUTPATIENT
Start: 2024-08-22 | End: 2024-08-22

## 2024-08-22 RX ORDER — ACETAMINOPHEN 500 MG
1000 TABLET ORAL ONCE
Status: COMPLETED | OUTPATIENT
Start: 2024-08-22 | End: 2024-08-22

## 2024-08-22 RX ORDER — BUPIVACAINE HCL/0.9 % NACL/PF 0.125 %
PLASTIC BAG, INJECTION (ML) EPIDURAL AS NEEDED
Status: DISCONTINUED | OUTPATIENT
Start: 2024-08-22 | End: 2024-08-22 | Stop reason: SURG

## 2024-08-22 RX ORDER — METHYLERGONOVINE MALEATE 0.2 MG/ML
200 INJECTION INTRAVENOUS ONCE AS NEEDED
Status: DISCONTINUED | OUTPATIENT
Start: 2024-08-22 | End: 2024-08-22

## 2024-08-22 RX ORDER — NALOXONE HCL 0.4 MG/ML
0.2 VIAL (ML) INJECTION
Status: DISCONTINUED | OUTPATIENT
Start: 2024-08-22 | End: 2024-08-24 | Stop reason: HOSPADM

## 2024-08-22 RX ORDER — OXYTOCIN/0.9 % SODIUM CHLORIDE 30/500 ML
999 PLASTIC BAG, INJECTION (ML) INTRAVENOUS ONCE
Status: COMPLETED | OUTPATIENT
Start: 2024-08-22 | End: 2024-08-22

## 2024-08-22 RX ORDER — FENTANYL CITRATE 50 UG/ML
INJECTION, SOLUTION INTRAMUSCULAR; INTRAVENOUS
Status: COMPLETED | OUTPATIENT
Start: 2024-08-22 | End: 2024-08-22

## 2024-08-22 RX ORDER — ACETAMINOPHEN 500 MG
1000 TABLET ORAL EVERY 6 HOURS
Status: DISPENSED | OUTPATIENT
Start: 2024-08-22 | End: 2024-08-23

## 2024-08-22 RX ORDER — SODIUM CHLORIDE 9 MG/ML
40 INJECTION, SOLUTION INTRAVENOUS AS NEEDED
Status: DISCONTINUED | OUTPATIENT
Start: 2024-08-22 | End: 2024-08-22

## 2024-08-22 RX ORDER — ONDANSETRON 2 MG/ML
4 INJECTION INTRAMUSCULAR; INTRAVENOUS EVERY 6 HOURS PRN
Status: DISCONTINUED | OUTPATIENT
Start: 2024-08-22 | End: 2024-08-24 | Stop reason: HOSPADM

## 2024-08-22 RX ORDER — ONDANSETRON 2 MG/ML
4 INJECTION INTRAMUSCULAR; INTRAVENOUS ONCE AS NEEDED
Status: COMPLETED | OUTPATIENT
Start: 2024-08-22 | End: 2024-08-22

## 2024-08-22 RX ORDER — ACETAMINOPHEN 325 MG/1
650 TABLET ORAL EVERY 6 HOURS
Status: DISCONTINUED | OUTPATIENT
Start: 2024-08-23 | End: 2024-08-24 | Stop reason: HOSPADM

## 2024-08-22 RX ORDER — MISOPROSTOL 200 UG/1
800 TABLET ORAL ONCE AS NEEDED
Status: DISCONTINUED | OUTPATIENT
Start: 2024-08-22 | End: 2024-08-22

## 2024-08-22 RX ORDER — SODIUM CHLORIDE 0.9 % (FLUSH) 0.9 %
10 SYRINGE (ML) INJECTION EVERY 12 HOURS SCHEDULED
Status: DISCONTINUED | OUTPATIENT
Start: 2024-08-22 | End: 2024-08-22

## 2024-08-22 RX ORDER — PROMETHAZINE HYDROCHLORIDE 12.5 MG/1
12.5 SUPPOSITORY RECTAL EVERY 6 HOURS PRN
Status: DISCONTINUED | OUTPATIENT
Start: 2024-08-22 | End: 2024-08-24 | Stop reason: HOSPADM

## 2024-08-22 RX ORDER — IBUPROFEN 600 MG/1
600 TABLET, FILM COATED ORAL EVERY 6 HOURS
Status: DISCONTINUED | OUTPATIENT
Start: 2024-08-24 | End: 2024-08-24 | Stop reason: HOSPADM

## 2024-08-22 RX ORDER — HYDROXYZINE HYDROCHLORIDE 50 MG/1
50 TABLET, FILM COATED ORAL EVERY 6 HOURS PRN
Status: DISCONTINUED | OUTPATIENT
Start: 2024-08-22 | End: 2024-08-24 | Stop reason: HOSPADM

## 2024-08-22 RX ORDER — KETOROLAC TROMETHAMINE 15 MG/ML
15 INJECTION, SOLUTION INTRAMUSCULAR; INTRAVENOUS EVERY 6 HOURS
Status: COMPLETED | OUTPATIENT
Start: 2024-08-23 | End: 2024-08-23

## 2024-08-22 RX ORDER — DROPERIDOL 2.5 MG/ML
0.62 INJECTION, SOLUTION INTRAMUSCULAR; INTRAVENOUS
Status: DISCONTINUED | OUTPATIENT
Start: 2024-08-22 | End: 2024-08-24 | Stop reason: HOSPADM

## 2024-08-22 RX ORDER — CITRIC ACID/SODIUM CITRATE 334-500MG
30 SOLUTION, ORAL ORAL ONCE
Status: COMPLETED | OUTPATIENT
Start: 2024-08-22 | End: 2024-08-22

## 2024-08-22 RX ORDER — FAMOTIDINE 10 MG/ML
20 INJECTION, SOLUTION INTRAVENOUS ONCE AS NEEDED
Status: COMPLETED | OUTPATIENT
Start: 2024-08-22 | End: 2024-08-22

## 2024-08-22 RX ORDER — LIDOCAINE HYDROCHLORIDE 10 MG/ML
0.5 INJECTION, SOLUTION INFILTRATION; PERINEURAL ONCE AS NEEDED
Status: DISCONTINUED | OUTPATIENT
Start: 2024-08-22 | End: 2024-08-22

## 2024-08-22 RX ORDER — KETOROLAC TROMETHAMINE 30 MG/ML
30 INJECTION, SOLUTION INTRAMUSCULAR; INTRAVENOUS ONCE
Status: COMPLETED | OUTPATIENT
Start: 2024-08-22 | End: 2024-08-22

## 2024-08-22 RX ORDER — PROMETHAZINE HYDROCHLORIDE 25 MG/1
25 TABLET ORAL EVERY 6 HOURS PRN
Status: DISCONTINUED | OUTPATIENT
Start: 2024-08-22 | End: 2024-08-24 | Stop reason: HOSPADM

## 2024-08-22 RX ORDER — OXYCODONE HYDROCHLORIDE 10 MG/1
10 TABLET ORAL EVERY 4 HOURS PRN
Status: DISCONTINUED | OUTPATIENT
Start: 2024-08-22 | End: 2024-08-24 | Stop reason: HOSPADM

## 2024-08-22 RX ORDER — ERYTHROMYCIN 5 MG/G
OINTMENT OPHTHALMIC
Status: ACTIVE
Start: 2024-08-22 | End: 2024-08-23

## 2024-08-22 RX ORDER — DIPHENHYDRAMINE HYDROCHLORIDE 50 MG/ML
25 INJECTION INTRAMUSCULAR; INTRAVENOUS EVERY 4 HOURS PRN
Status: DISCONTINUED | OUTPATIENT
Start: 2024-08-22 | End: 2024-08-24 | Stop reason: HOSPADM

## 2024-08-22 RX ORDER — OXYTOCIN/0.9 % SODIUM CHLORIDE 30/500 ML
125 PLASTIC BAG, INJECTION (ML) INTRAVENOUS ONCE AS NEEDED
Status: DISCONTINUED | OUTPATIENT
Start: 2024-08-22 | End: 2024-08-24 | Stop reason: HOSPADM

## 2024-08-22 RX ORDER — ONDANSETRON 4 MG/1
4 TABLET, ORALLY DISINTEGRATING ORAL EVERY 6 HOURS PRN
Status: DISCONTINUED | OUTPATIENT
Start: 2024-08-22 | End: 2024-08-24 | Stop reason: HOSPADM

## 2024-08-22 RX ORDER — HYDROCORTISONE 25 MG/G
CREAM TOPICAL 3 TIMES DAILY PRN
Status: DISCONTINUED | OUTPATIENT
Start: 2024-08-22 | End: 2024-08-24 | Stop reason: HOSPADM

## 2024-08-22 RX ORDER — OXYCODONE HYDROCHLORIDE 5 MG/1
5 TABLET ORAL EVERY 4 HOURS PRN
Status: DISCONTINUED | OUTPATIENT
Start: 2024-08-22 | End: 2024-08-24 | Stop reason: HOSPADM

## 2024-08-22 RX ORDER — BUPIVACAINE HYDROCHLORIDE 7.5 MG/ML
INJECTION, SOLUTION EPIDURAL; RETROBULBAR
Status: COMPLETED | OUTPATIENT
Start: 2024-08-22 | End: 2024-08-22

## 2024-08-22 RX ORDER — SODIUM CHLORIDE 0.9 % (FLUSH) 0.9 %
10 SYRINGE (ML) INJECTION AS NEEDED
Status: DISCONTINUED | OUTPATIENT
Start: 2024-08-22 | End: 2024-08-22

## 2024-08-22 RX ORDER — ONDANSETRON 2 MG/ML
4 INJECTION INTRAMUSCULAR; INTRAVENOUS ONCE AS NEEDED
Status: DISCONTINUED | OUTPATIENT
Start: 2024-08-22 | End: 2024-08-24 | Stop reason: HOSPADM

## 2024-08-22 RX ORDER — MORPHINE SULFATE 2 MG/ML
2 INJECTION, SOLUTION INTRAMUSCULAR; INTRAVENOUS
Status: ACTIVE | OUTPATIENT
Start: 2024-08-22 | End: 2024-08-22

## 2024-08-22 RX ORDER — SODIUM CHLORIDE, SODIUM LACTATE, POTASSIUM CHLORIDE, CALCIUM CHLORIDE 600; 310; 30; 20 MG/100ML; MG/100ML; MG/100ML; MG/100ML
125 INJECTION, SOLUTION INTRAVENOUS CONTINUOUS
Status: DISCONTINUED | OUTPATIENT
Start: 2024-08-22 | End: 2024-08-22

## 2024-08-22 RX ORDER — HYDROMORPHONE HYDROCHLORIDE 1 MG/ML
0.5 INJECTION, SOLUTION INTRAMUSCULAR; INTRAVENOUS; SUBCUTANEOUS
Status: DISCONTINUED | OUTPATIENT
Start: 2024-08-22 | End: 2024-08-22 | Stop reason: HOSPADM

## 2024-08-22 RX ORDER — CARBOPROST TROMETHAMINE 250 UG/ML
250 INJECTION, SOLUTION INTRAMUSCULAR
Status: DISCONTINUED | OUTPATIENT
Start: 2024-08-22 | End: 2024-08-22

## 2024-08-22 RX ORDER — TRANEXAMIC ACID 10 MG/ML
1000 INJECTION, SOLUTION INTRAVENOUS ONCE AS NEEDED
Status: DISCONTINUED | OUTPATIENT
Start: 2024-08-22 | End: 2024-08-22

## 2024-08-22 RX ADMIN — Medication 200 MCG: at 14:55

## 2024-08-22 RX ADMIN — SODIUM CHLORIDE 2000 MG: 900 INJECTION INTRAVENOUS at 14:36

## 2024-08-22 RX ADMIN — MORPHINE SULFATE 150 MCG: 4 INJECTION, SOLUTION INTRAMUSCULAR; INTRAVENOUS at 14:51

## 2024-08-22 RX ADMIN — BUPIVACAINE HYDROCHLORIDE 1.4 ML: 7.5 INJECTION, SOLUTION EPIDURAL; RETROBULBAR at 14:51

## 2024-08-22 RX ADMIN — SODIUM CITRATE AND CITRIC ACID MONOHYDRATE 30 ML: 334; 500 SOLUTION ORAL at 14:35

## 2024-08-22 RX ADMIN — ACETAMINOPHEN 1000 MG: 500 TABLET ORAL at 20:21

## 2024-08-22 RX ADMIN — Medication 200 MCG: at 15:14

## 2024-08-22 RX ADMIN — KETOROLAC TROMETHAMINE 30 MG: 30 INJECTION, SOLUTION INTRAMUSCULAR at 16:25

## 2024-08-22 RX ADMIN — Medication 200 MCG: at 15:33

## 2024-08-22 RX ADMIN — FENTANYL CITRATE 10 MCG: 50 INJECTION, SOLUTION INTRAMUSCULAR; INTRAVENOUS at 14:51

## 2024-08-22 RX ADMIN — SODIUM CHLORIDE, POTASSIUM CHLORIDE, SODIUM LACTATE AND CALCIUM CHLORIDE 125 ML/HR: 600; 310; 30; 20 INJECTION, SOLUTION INTRAVENOUS at 13:33

## 2024-08-22 RX ADMIN — KETOROLAC TROMETHAMINE 15 MG: 15 INJECTION, SOLUTION INTRAMUSCULAR; INTRAVENOUS at 23:32

## 2024-08-22 RX ADMIN — FAMOTIDINE 20 MG: 10 INJECTION INTRAVENOUS at 14:35

## 2024-08-22 RX ADMIN — ACETAMINOPHEN 1000 MG: 500 TABLET ORAL at 14:35

## 2024-08-22 RX ADMIN — Medication 200 MCG: at 15:02

## 2024-08-22 RX ADMIN — ONDANSETRON 4 MG: 2 INJECTION, SOLUTION INTRAMUSCULAR; INTRAVENOUS at 14:35

## 2024-08-22 RX ADMIN — Medication 125 ML/HR: at 16:44

## 2024-08-22 RX ADMIN — Medication 999 ML/HR: at 15:21

## 2024-08-22 RX ADMIN — SODIUM CHLORIDE, POTASSIUM CHLORIDE, SODIUM LACTATE AND CALCIUM CHLORIDE 125 ML/HR: 600; 310; 30; 20 INJECTION, SOLUTION INTRAVENOUS at 10:59

## 2024-08-22 RX ADMIN — CLONIDINE HYDROCHLORIDE 100 ML: 0.1 INJECTION, SOLUTION EPIDURAL at 15:27

## 2024-08-22 RX ADMIN — Medication 200 MCG: at 15:41

## 2024-08-22 NOTE — LACTATION NOTE
P1,36.2 weeks baby- new admission. Infant is transitioning in the nursery. HGP initiated at this time with instructions of use, cleaning the parts and milk storage. Encouraged PT to pump every 3h for 15 min.on each breast if baby is not BF. Educated on the importance of stimulation for adequate milk supply. PT denies any questions. She has [facility] insurance and dad will call them tomorrow. . Encouraged her to call if needing further help.

## 2024-08-22 NOTE — ANESTHESIA POSTPROCEDURE EVALUATION
Patient: Iain Parkinson    Procedure Summary       Date: 24 Room / Location:  [facility] /  [facility]    Anesthesia Start: 1443 Anesthesia Stop: 161    Procedure:  SECTION REPEAT (Abdomen) Diagnosis:     Surgeons: Serina Moreno MD Provider: Bo Rojas MD    Anesthesia Type: spinal ASA Status: 2            Anesthesia Type: spinal    Vitals  Vitals Value Taken Time   BP 94/59 24 1745   Temp 36.7 °C (98.1 °F) 24 1615   Pulse 74 24 1745   Resp     SpO2 99 % 24 1744   Vitals shown include unfiled device data.        Post Anesthesia Care and Evaluation    Patient location during evaluation: bedside  Pain management: adequate    Airway patency: patent  Anesthetic complications: No anesthetic complications    Cardiovascular status: acceptable  Respiratory status: acceptable  Hydration status: acceptable

## 2024-08-22 NOTE — ANESTHESIA PREPROCEDURE EVALUATION
Anesthesia Evaluation     no history of anesthetic complications:   NPO Solid Status: > 8 hours  NPO Liquid Status: > 2 hours           Airway   Mallampati: II  Neck ROM: full  no difficulty expected  Dental - normal exam     Pulmonary - negative pulmonary ROS and normal exam   (-) COPD, asthma, sleep apnea, not a smoker  Cardiovascular - negative cardio ROS and normal exam  Exercise tolerance: good (4-7 METS)    Rhythm: regular  Rate: normal    (-) hypertension, valvular problems/murmurs, past MI, CAD, dysrhythmias, angina      Neuro/Psych- negative ROS  (-) seizures, TIA, CVA  GI/Hepatic/Renal/Endo - negative ROS   (-) GERD, liver disease, no renal disease, diabetes, no thyroid disorder    Musculoskeletal (-) negative ROS    Abdominal    Substance History      OB/GYN    (+) Pregnant    Comment: 36 wk 2 days, S/P circlage      Other                          Anesthesia Plan    ASA 2     spinal       Anesthetic plan, risks, benefits, and alternatives have been provided, discussed and informed consent has been obtained with: patient and spouse/significant other.        CODE STATUS:    Level Of Support Discussed With: Patient  Code Status (Patient has no pulse and is not breathing): CPR (Attempt to Resuscitate)  Medical Interventions (Patient has pulse or is breathing): Full Support

## 2024-08-22 NOTE — ANESTHESIA PROCEDURE NOTES
Spinal Block      Patient reassessed immediately prior to procedure    Patient location during procedure: OR  Start Time: 8/22/2024 2:51 PM  Performed By  Anesthesiologist: Bo Rojas MD CRNA/CAA: Dean Cunningham CRNA  Preanesthetic Checklist  Completed: patient identified, IV checked, site marked, risks and benefits discussed, surgical consent, monitors and equipment checked, pre-op evaluation and timeout performed  Spinal Block Prep:  Patient Position:sitting  Sterile Tech:cap, gloves, mask and sterile barriers  Prep:Chloraprep  Patient Monitoring:blood pressure monitoring, continuous pulse oximetry and EKG    Spinal Block Procedure  Approach:midline  Location:L3-L4  Needle Type:Olesya  Needle Gauge:25 G  Placement of Spinal needle event:cerebrospinal fluid aspirated  Paresthesia: no  Fluid Appearance:clear  Medications: fentaNYL citrate (PF) (SUBLIMAZE) injection - Intrathecal, Back   10 mcg - 8/22/2024 2:51:00 PM  Morphine sulfate (PF) injection - Spinal, Back   150 mcg - 8/22/2024 2:51:00 PM  bupivacaine PF (MARCAINE) 0.75 % injection - Spinal   1.4 mL - 8/22/2024 2:51:00 PM   Post Assessment  Patient Tolerance:patient tolerated the procedure well with no apparent complications  Complications no

## 2024-08-22 NOTE — H&P
[facility]  Obstetric History and Physical    Chief Complaint   Patient presents with    Scheduled      Patient is a scheduled  and removal of cerclage. Patient denies LOF, VB, and enodorses +FM.        Subjective     Patient is a 28 y.o. female  currently at 36w2d, who presents with plan for repeat  secondary to history of classical  section.  She has a pregnancy complicated by GDM (for which she is on Metformin and Lantus 5/10), h/o  delivery with cerclage in place.  She had a prior pregnancy delivered at 26w due to  labor, delivered by classical . Reports no fever, mild cough but attributes it to thirst.        Prenatal Information:  Prenatal Results       Initial Prenatal Labs       Test Value Reference Range Date Time    Hemoglobin  11.1 g/dL 12.0 - 15.9 24 1830       12.4 g/dL 12.0 - 15.9 24 1454       13.1 g/dL 11.1 - 15.9 24     Hematocrit  32.9 % 34.0 - 46.6 24 1830       36.9 % 34.0 - 46.6 24 1454       38.6 % 34.0 - 46.6 24     Platelets  258 10*3/mm3 140 - 450 24 1830       266 10*3/mm3 140 - 450 24 1454       316 x10E3/uL 150 - 450 24     Rubella IgG  8.97 index Immune >0.99 24     Hepatitis B SAg  Negative  Negative 24     Hepatitis C Ab  Non Reactive  Non Reactive 24     RPR  Non Reactive  Non Reactive 24     T. Pallidum Ab   Non-Reactive  Non-Reactive 24 1830    ABO  O   24 1830    Rh  Positive   24 1830    Antibody Screen  Negative   24 1830       Negative  Negative 24     HIV  Non Reactive  Non Reactive 24     Urine Culture  No growth   24 1317       Final report   24 1602    Gonorrhea  Negative  Negative 24 1602    Chlamydia  Negative  Negative 24 1602    TSH        HgB A1c   5.3 % 4.8 - 5.6 24     Varicella IgG        Hemoglobinopathy Fractionation        Hemoglobinopathy (genetic  testing)        Cystic fibrosis                   Fetal testing        Test Value Reference Range Date Time    NIPT        MSAFP        AFP-4                  2nd and 3rd Trimester       Test Value Reference Range Date Time    Hemoglobin (repeated)  12.3 g/dL 12.0 - 15.9 08/22/24 1059       11.3 g/dL 12.0 - 15.9 06/05/24 1220    Hematocrit (repeated)  36.6 % 34.0 - 46.6 08/22/24 1059       33.8 % 34.0 - 46.6 06/05/24 1220    Platelets   205 10*3/mm3 140 - 450 08/22/24 1059       232 10*3/mm3 140 - 450 06/05/24 1220       258 10*3/mm3 140 - 450 04/17/24 1830       266 10*3/mm3 140 - 450 04/05/24 1454       316 x10E3/uL 150 - 450 03/08/24     1 hour GTT   200 mg/dL 65 - 139 06/05/24 1220    Antibody Screen (repeated)        3rd TM syphilis scrn (repeated)  RPR         3rd TM syphilis scrn (repeated) TP-Ab        3rd TM syphilis screen TB-Ab (FTA)  Non Reactive  Non Reactive 06/05/24 1220    Syphilis cascade test TP-Ab (EIA)        Syphilis cascade TPPA        GTT Fasting        GTT 1 Hr        GTT 2 Hr        GTT 3 Hr        Group B Strep                  Other testing        Test Value Reference Range Date Time    Parvo IgG         CMV IgG                   Drug Screening       Test Value Reference Range Date Time    Amphetamine Screen  Negative ng/mL Vdyhqr=7747 02/08/24 1602    Barbiturate Screen  Negative ng/mL Yndezr=927 02/08/24 1602    Benzodiazepine Screen  Negative ng/mL Qypthe=329 02/08/24 1602    Methadone Screen  Negative ng/mL Mnkesw=966 02/08/24 1602    Phencyclidine Screen  Negative ng/mL Cutoff=25 02/08/24 1602    Opiates Screen  Negative ng/mL Trvzxg=899 02/08/24 1602    THC Screen  Negative ng/mL Cutoff=50 02/08/24 1602    Cocaine Screen  Negative ng/mL Euqdtf=232 02/08/24 1602    Propoxyphene Screen  Negative ng/mL Vluddq=130 02/08/24 1602    Buprenorphine Screen        Methamphetamine Screen        Oxycodone Screen        Tricyclic Antidepressants Screen                  Legend    ^: Historical                           External Prenatal Results       Pregnancy Outside Results - Transcribed From Office Records - See Scanned Records For Details       Test Value Date Time    ABO  O  04/17/24 1830    Rh  Positive  04/17/24 1830    Antibody Screen  Negative  04/17/24 1830       Negative  03/08/24     Varicella IgG       Rubella  8.97 index 03/08/24     Hgb  12.3 g/dL 08/22/24 1059       11.3 g/dL 06/05/24 1220       11.1 g/dL 04/17/24 1830       12.4 g/dL 04/05/24 1454       13.1 g/dL 03/08/24     Hct  36.6 % 08/22/24 1059       33.8 % 06/05/24 1220       32.9 % 04/17/24 1830       36.9 % 04/05/24 1454       38.6 % 03/08/24     HgB A1c   5.3 % 03/08/24     1h GTT  200 mg/dL 06/05/24 1220    3h GTT Fasting       3h GTT 1 hour       3h GTT 2 hour       3h GTT 3 hour        Gonorrhea (discrete)  Negative  02/08/24 1602    Chlamydia (discrete)  Negative  02/08/24 1602    RPR  Non Reactive  03/08/24     Syphils cascade: TP-Ab (FTA)  Non-Reactive  04/17/24 1830    TP-Ab  Non Reactive  06/05/24 1220    TP-Ab (EIA)       TPPA       HBsAg  Negative  03/08/24     Herpes Simplex Virus PCR       Herpes Simplex VIrus Culture       HIV  Non Reactive  03/08/24     Hep C RNA Quant PCR       Hep C Antibody  Non Reactive  03/08/24     AFP       NIPT       Cystic Fibroisis        Group B Strep       GBS Susceptibility to Clindamycin       GBS Susceptibility to Erythromycin       Fetal Fibronectin       Genetic Testing, Maternal Blood                 Drug Screening       Test Value Date Time    Urine Drug Screen       Amphetamine Screen  Negative ng/mL 02/08/24 1602    Barbiturate Screen  Negative ng/mL 02/08/24 1602    Benzodiazepine Screen  Negative ng/mL 02/08/24 1602    Methadone Screen  Negative ng/mL 02/08/24 1602    Phencyclidine Screen  Negative ng/mL 02/08/24 1602    Opiates Screen       THC Screen       Cocaine Screen       Propoxyphene Screen  Negative ng/mL 02/08/24 1602    Buprenorphine Screen       Methamphetamine Screen        Oxycodone Screen       Tricyclic Antidepressants Screen                 Legend    ^: Historical                             Past OB History:     OB History    Para Term  AB Living   2 1 0 1 0 1   SAB IAB Ectopic Molar Multiple Live Births   0 0 0 0 0 1      # Outcome Date GA Lbr Guru/2nd Weight Sex Type Anes PTL Lv   2 Current            1  17 26w2d  754 g (1 lb 10.6 oz) F CS-Classical Gen Y BERNIE      Name: STALIN DE LA GARZA      Apgar1: 3  Apgar5: 4       Past Medical History: Past Medical History:   Diagnosis Date    Endometriosis     Seasonal allergies     Uterine polyp       Past Surgical History Past Surgical History:   Procedure Laterality Date    CERVICAL CERCLAGE N/A 2024    Procedure: CERVICAL CERCLAGE;  Surgeon: Rosaline Roach MD;  Location: [facility];  Service: Obstetrics;  Laterality: N/A;     SECTION      D & C HYSTEROSCOPY N/A 2022    Procedure: DILATATION AND CURETTAGE HYSTEROSCOPY WITH MYOSURE,POLYPECTOMY;  Surgeon: Serina Moreno MD;  Location: [facility];  Service: Obstetrics/Gynecology;  Laterality: N/A;    MINI-LAPAROTOMY  2020    subcutaneous endometrioma      Family History: Family History   Problem Relation Age of Onset    Thyroid disease Mother     Diabetes Father     Thyroid disease Sister     Malig Hyperthermia Neg Hx       Social History:  reports that she has never smoked. She has never used smokeless tobacco.   reports no history of alcohol use.   reports no history of drug use.         Review of Systems - Negative except per HPI for 10 point review of systems including General, Psychological, Ophthalmic, ENT, Endocrine, Respiratory, Cardiovascular, Gastrointestinal, Genito-Urinary, Musculoskeletal, Neurological, Dermatological      Objective       Vital Signs Range for the last 24 hours  Temperature: Temp:  [98.1 °F (36.7 °C)] 98.1 °F (36.7 °C)   Temp Source: Temp src: Oral   BP: BP: (126)/(66) 126/66   Pulse: Heart Rate:   [103] 103   Respirations: Resp:  [16] 16   SPO2:     O2 Amount (l/min):     O2 Devices     Weight:       Physical Examination: General appearance - alert, well appearing, and in no distress  Mental status - alert, oriented to person, place, and time  Eyes - sclera anicteric, left eye normal, right eye normal  Chest - no tachypnea, retractions or cyanosis  Heart - normal rate and regular rhythm  Abdomen - soft, nontender, gravid  Pelvic - deferred  Back exam - full range of motion, no tenderness, palpable spasm or pain on motion   Neurological - alert, oriented, normal speech, no focal findings or movement disorder noted   Musculoskeletal - no joint tenderness, deformity or swelling  Extremities - pedal edema trace  Skin - normal coloration and turgor, no rashes, no suspicious skin lesions noted    Presentation: Vertex   Cervix: Exam by:     Dilation:     Effacement:     Station:         Fetal Heart Rate Assessment   Method:     Beats/min:     Baseline:     Varibility:     Accels:     Decels:     Tracing Category:       Uterine Assessment   Method:     Frequency (min):     Ctx Count in 10 min:     Duration:     Intensity:     Intensity by IUPC:     Resting Tone:     Resting Tone by IUPC:     Gueydan Units:       Lab Results   Component Value Date    WBC 7.80 08/22/2024    HGB 12.3 08/22/2024    HCT 36.6 08/22/2024    MCV 90.8 08/22/2024     08/22/2024      Lab Results   Component Value Date    GLUCOSE 109 (H) 04/05/2024    BUN 6 04/05/2024    CREATININE 0.47 (L) 04/05/2024     (L) 04/05/2024    K 3.7 04/05/2024     04/05/2024    CALCIUM 8.7 04/05/2024    PROTEINTOT 6.9 04/05/2024    ALBUMIN 3.8 04/05/2024    ALT 25 04/05/2024    AST 17 04/05/2024    ALKPHOS 68 04/05/2024    BILITOT <0.2 04/05/2024    GLOB 3.1 04/05/2024    AGRATIO 1.2 04/05/2024    BCR 12.8 04/05/2024    ANIONGAP 11.0 04/05/2024    EGFR 131.5 04/05/2024         US: Intrauterine pregnancy at 33w1d  Normal Interval growth  EFW:  51.3%ile, AC: 62.7%ile  Estimated fetal weight:   2209 g  JOSE A:  15.9 cm  Placental location: Fundal  Fetal position: Vertex  BPP     Assessment & Plan   Assessment:  1.  Intrauterine pregnancy at 36w2d weeks gestation with reactive, reassuring fetal status.    2.  H/o classical  section  3. GDMA2: on metformin and Lantus  4. Cerclage in place  5. Viral symptoms: panel pending    Plan:  1. Plan for repeat  and cerclage removal.  Due to patient's GDM did not get late  steroids.    2. Plan of care has been reviewed with patient and .  Risks, benefits of treatment plan have been discussed.  All questions have been answered.      Serina Moreno MD  2024  11:19 EDT

## 2024-08-22 NOTE — OP NOTE
Operative Report: Repeat Low Transverse  Section, Cerclage remova    Patient Name: Iain Parkinson    MRN: [id number]    Date of Surgery: 24     Pre-op Diagnoses:   1. Intrauterine Pregnancy at 36w2d  2. Previous classical  section  3. GDMA2  4. Cerclage in place    Post op Diagnoses: same    Procedure: Repeat low transverse  section via pfannenstiel incision    Surgeon: Serina Moreno MD     Assistant: Hayder Denis MD  was responsible for performing the following activities: Retraction, Suction, Irrigation, and Delivery of Fetus and their skilled assistance was necessary for the success of this case.       Anesthesia: Spinal    QBL: Quantitative Blood Loss (mL): 586 mL /       Specimen: Placenta to pathology    Complication: none immediately noted    Findings: normal uterus, tubes and ovaries, mild adhesive disease, cerclage in place, normal cervix    Infant Details: Female infant in vertex presentation, weight 2600g, apgars 7/8,     Indication and Consent  Iain Parkinson is a 28 y.o.  at 36w2d who presented with plan for repeat  secondary to history of  delivery via classical  section. She had a pregnancy complicated by GDMA2.  The patient understood that the risks of  section include, but are not limited to, visceral or vascular injury, infection, blood loss and need for transfusion, prolonged hospitalization and reoperation. The patient stated understanding and desired to proceed. All questions were answered.    Procedure:  The patient was taken to the operating room where spinal anesthesia was placed and found to be adequate. Antibiotics were given for infection prophylaxis. She was prepped and draped in the dorsal supine position with a leftward tilt. Timeout was performed.  A Pfannenstiel skin incision was made with the scalpel. The incision was carried down to the fascia sharply. The superior aspect of the fascia was grasped with Kocher  clamps and the underlying rectus muscle was dissected off sharply with eubanks scissors. In a similar fashion, the inferior aspect of the fascia was grasped with kocher clamps and the rectus muscle and pyramidalis muscle were dissected off with eubanks scissors. The rectus muscle was  in the midline down to the level of the pubic symphysis. Preperitoneal fatty tissue was bluntly dissected to expose the peritoneum. The peritoneum was found to be free of adherent bowel and entered bluntly. The peritoneal incision was extended superiorly and inferiorly to the bladder reflection with good visualization of the bladder.    The bladder blade was inserted and vesicouterine peritoneum identified. Intraabdominal survey revealed scant, clear peritoneal fluid and the thinned out lower uterine segment. The bladder blade was repositioned to keep the bladder out of the operative field. The lower uterine segment was incised with a scalpel. The amniotic sac was ruptured and clear fluid was noted. The uterine incision was extended bluntly with upward traction.    The fetus was in cephalic position. The head was elevated out of the pelvis with special attention paid to avoid using the uterine incision as a fulcrum. Gentle fundal pressure was applied once the head was brought into the incision. The infant was delivered with no difficulty. The mouth and nose were suctioned with a bulb. The cord was clamped and cut. The infant was handed to the nursery staff. IV oxytocin was initiated to facilitate uterine contractions. The placenta was delivered intact with manual massage of the uterine fundus. The uterus was then exteriorized. The inside of the uterus was gently wiped with a lap sponge to assure complete placental membrane removal. The uterine incision was closed with 0-0 Monocryl in a running locked fashion. A second imbricating layer was performed. Hemostasis was noted. The ovaries and tubes were found to be normal. The uterus,  tubes and ovaries were then returned to the abdominal cavity. The blood clots and fluid were wiped out of the abdomen and pelvis with moist laparotomy sponges. The uterine incision was inspected; oozing was noted from two areas along the hysterotomy which were made hemostatic with 3-0 vicryl figure-of- eight sutures.  There was some oozing from the serosa that was hemostatic with cautery and Silvia.  The uterine incision and serosa were reinspected and good hemostasis was noted.     The peritoneum was closed with a 3-0 Vicryl in a continuous running fashion.  The rectus muscles and fascia were inspected and noted to be hemostatic.  The fascial layer was closed with 0-0 Vicryl suture. 3-0 Vicryl was used to reapproximate the subcutaneous tissues. The skin was closed with 3-0 Monocryl in a subcuticular fashion. Incisional cocktail was administered for pain control. The patient tolerated the procedure well. All the counts were correct times two. Incisional glue was applied and the sterile drapes were removed. The patient was placed in a frog leg position and a Graves speculum was inserted vaginally.  The patient was placed in Trendelenburg and the tail of the Mersilene tape was identified and grasped with a ring forcep. The proximal end of the Mersilene tape was transected proximal to the cervix and the cerclage was removed in its entirety.  There was no active bleeding noted.      The patient was taken to the recovery room in stable condition.        Serina Moreno MD

## 2024-08-23 LAB
BASOPHILS # BLD AUTO: 0.03 10*3/MM3 (ref 0–0.2)
BASOPHILS NFR BLD AUTO: 0.3 % (ref 0–1.5)
DEPRECATED RDW RBC AUTO: 43 FL (ref 37–54)
EOSINOPHIL # BLD AUTO: 0.12 10*3/MM3 (ref 0–0.4)
EOSINOPHIL NFR BLD AUTO: 1.3 % (ref 0.3–6.2)
ERYTHROCYTE [DISTWIDTH] IN BLOOD BY AUTOMATED COUNT: 12.8 % (ref 12.3–15.4)
GLUCOSE BLDC GLUCOMTR-MCNC: 84 MG/DL (ref 70–130)
HCT VFR BLD AUTO: 32 % (ref 34–46.6)
HGB BLD-MCNC: 10.7 G/DL (ref 12–15.9)
IMM GRANULOCYTES # BLD AUTO: 0.09 10*3/MM3 (ref 0–0.05)
IMM GRANULOCYTES NFR BLD AUTO: 1 % (ref 0–0.5)
LYMPHOCYTES # BLD AUTO: 2.24 10*3/MM3 (ref 0.7–3.1)
LYMPHOCYTES NFR BLD AUTO: 24.4 % (ref 19.6–45.3)
MCH RBC QN AUTO: 30.8 PG (ref 26.6–33)
MCHC RBC AUTO-ENTMCNC: 33.4 G/DL (ref 31.5–35.7)
MCV RBC AUTO: 92.2 FL (ref 79–97)
MONOCYTES # BLD AUTO: 0.82 10*3/MM3 (ref 0.1–0.9)
MONOCYTES NFR BLD AUTO: 8.9 % (ref 5–12)
NEUTROPHILS NFR BLD AUTO: 5.87 10*3/MM3 (ref 1.7–7)
NEUTROPHILS NFR BLD AUTO: 64.1 % (ref 42.7–76)
NRBC BLD AUTO-RTO: 0 /100 WBC (ref 0–0.2)
PLATELET # BLD AUTO: 191 10*3/MM3 (ref 140–450)
PMV BLD AUTO: 9.8 FL (ref 6–12)
RBC # BLD AUTO: 3.47 10*6/MM3 (ref 3.77–5.28)
WBC NRBC COR # BLD AUTO: 9.17 10*3/MM3 (ref 3.4–10.8)

## 2024-08-23 PROCEDURE — 63710000001 DIPHENHYDRAMINE PER 50 MG: Performed by: REGISTERED NURSE

## 2024-08-23 PROCEDURE — 85025 COMPLETE CBC W/AUTO DIFF WBC: CPT | Performed by: OBSTETRICS & GYNECOLOGY

## 2024-08-23 PROCEDURE — 25010000002 ENOXAPARIN PER 10 MG

## 2024-08-23 PROCEDURE — 0503F POSTPARTUM CARE VISIT: CPT

## 2024-08-23 PROCEDURE — 82948 REAGENT STRIP/BLOOD GLUCOSE: CPT

## 2024-08-23 PROCEDURE — 25010000002 KETOROLAC TROMETHAMINE PER 15 MG: Performed by: OBSTETRICS & GYNECOLOGY

## 2024-08-23 RX ORDER — ENOXAPARIN SODIUM 100 MG/ML
40 INJECTION SUBCUTANEOUS EVERY 24 HOURS
Status: DISCONTINUED | OUTPATIENT
Start: 2024-08-23 | End: 2024-08-24 | Stop reason: HOSPADM

## 2024-08-23 RX ADMIN — KETOROLAC TROMETHAMINE 15 MG: 15 INJECTION, SOLUTION INTRAMUSCULAR; INTRAVENOUS at 06:51

## 2024-08-23 RX ADMIN — DIPHENHYDRAMINE HYDROCHLORIDE 25 MG: 25 CAPSULE ORAL at 00:11

## 2024-08-23 RX ADMIN — KETOROLAC TROMETHAMINE 15 MG: 15 INJECTION, SOLUTION INTRAMUSCULAR; INTRAVENOUS at 12:55

## 2024-08-23 RX ADMIN — ACETAMINOPHEN 1000 MG: 500 TABLET ORAL at 08:45

## 2024-08-23 RX ADMIN — ENOXAPARIN SODIUM 40 MG: 100 INJECTION SUBCUTANEOUS at 12:55

## 2024-08-23 RX ADMIN — ACETAMINOPHEN 325MG 650 MG: 325 TABLET ORAL at 20:15

## 2024-08-23 RX ADMIN — KETOROLAC TROMETHAMINE 15 MG: 15 INJECTION, SOLUTION INTRAMUSCULAR; INTRAVENOUS at 18:24

## 2024-08-23 RX ADMIN — ACETAMINOPHEN 1000 MG: 500 TABLET ORAL at 03:10

## 2024-08-23 NOTE — CONSULTS
met with pt at bedside with chaplain Slater. Pt tearful and expressed difficulty in dealing with circumstances surrounding this birth with respect to birth of previous child. Chaplains provided conversational emotional support, and will continue to follow.

## 2024-08-23 NOTE — PLAN OF CARE
Goal Outcome Evaluation:   Patient doing well. Good pain control with scheduled medications. Up ad franky. To NICU to see . Voiding without difficulty. Incision CDI with glue. Using hospital grade breast pump as  remains in NICU at this time.

## 2024-08-23 NOTE — LACTATION NOTE
Gave pt 27 size flanges for hgp. Educated on nipple care. Encouraged to call LC as needed.        Lactation Consult Note    Evaluation Completed: 2024 13:04 EDT  Patient Name: Iain Parkinson  :  1995  MRN:  [id number]     REFERRAL  INFORMATION:                                         DELIVERY HISTORY:        Skin to skin initiation date/time:      Skin to skin end date/time:           MATERNAL ASSESSMENT:                               INFANT ASSESSMENT:  Information for the patient's :  Yady Parkinson [[id number]]   No past medical history on file.                                                                                                   MATERNAL INFANT FEEDING:                                                                       EQUIPMENT TYPE:                                 BREAST PUMPING:          LACTATION REFERRALS:

## 2024-08-23 NOTE — LACTATION NOTE
Pt reports she pumped 2 times yesterday and didn't pump during the night. Encouraged to pump every 3 hours for 15 min. Pt denies questions. Encouraged to call LC as needed. Hgp at bedside    Lactation Consult Note    Evaluation Completed: 2024 09:18 EDT  Patient Name: Iain Parkinson  :  1995  MRN:  [id number]     REFERRAL  INFORMATION:                                         DELIVERY HISTORY:        Skin to skin initiation date/time:      Skin to skin end date/time:           MATERNAL ASSESSMENT:                               INFANT ASSESSMENT:  Information for the patient's :  Yady Parkinson [[id number]]   No past medical history on file.                                                                                                   MATERNAL INFANT FEEDING:                                                                       EQUIPMENT TYPE:                                 BREAST PUMPING:          LACTATION REFERRALS:

## 2024-08-23 NOTE — PROGRESS NOTES
"Discharge Planning Assessment  [facility]     Patient Name: Iain Parkinson  MRN: [id number]  Today's Date: 8/23/2024    Admit Date: 8/22/2024    Plan: Infant may discharge to mother when medically ready. JESSY Pickering.   Discharge Needs Assessment    No documentation.                  Discharge Plan       Row Name 08/23/24 1352       Plan    Plan Infant may discharge to mother when medically ready. JESSY Pickering.    Plan Comments Mother: Iain Parkinson, MRN: [id number]; infant: Yady \"Yoli\" Iggy, MRN: [id number]. CSW was consulted for \"NICU admission & questions about SSI.\" Of note, no toxicology screens were ordered for mother or infant as need was not warranted at this time. CSW met with mother at bedside while father of infant/ was in the room. Mother gave consent for father to be present during assessment. Mother verified address, phone number, and insurance. Father reports he understands the process of adding infant to health insurance. Father inquired about Medicaid. CSW explained the role of [employer]. Father stated he was about to leave, but will notify his nurse once he returns, so they can call [employer]. Mother reports having a car seat, crib/bassinet, clothes, and diapers for infant. Mother and father have one other child: 7yr old, who is being cared for by a family member during hospitalization. Mother reports, family members and father of infant/ are available for support as needed. Infant's pediatrician is TBD; CSW provided a list of pediatricians to parents. Mother is comfortable scheduling appointments for infant and has reliable transportation. Mother is not current with [facility] but is familiar with the program. Father asked about qualifications for SSI for low birth weight. CSW explained that unfortunately infant does not qualify due to weighing 5lbs, and the qualification for SSI is 3lbs 12oz or less. Father voiced understanding. CSW spoke to parents about the Healthy Start " program and mother declined a referral today. CSW explained her role as NICU  and encouraged parents to reach out if needed. CSW provided mother with a packet of resources including: WIC, HANDS, Healthy Start, transportation, infant supplies, counseling, online support groups, postpartum mood and anxiety resources, NICU parent resources, and general community resources. CSW spent time building rapport with mother, and offered validation, support, and encouragement to mother throughout assessment. Mother and father were polite and appropriate, and denied having unmet needs or concerns at this time. CSW will remain available for psychosocial needs while infant is in the NICU. JESSY Pickering.                  Continued Care and Services - Admitted Since 8/22/2024    No active coordination exists for this encounter.       Expected Discharge Date and Time       Expected Discharge Date Expected Discharge Time    Aug 23, 2024            Demographic Summary       Row Name 08/23/24 1351       General Information    Admission Type inpatient    Arrived From home    Referral Source nursing    Reason for Consult community resources;other (see comments)    General Information Comments NICU admission & questions about SSI                   Functional Status       Row Name 08/23/24 1352       Functional Status, IADL    Medications independent    Meal Preparation independent    Housekeeping independent    Laundry independent    Shopping independent       Mental Status    General Appearance WDL WDL       Mental Status Summary    Recent Changes in Mental Status/Cognitive Functioning no changes       Employment/    Employment Status employed full-time    Employment/ Comments [employer]                    Psychosocial       Row Name 08/23/24 1352       Behavior WDL    Behavior WDL WDL       Emotion Mood WDL    Emotion/Mood/Affect WDL WDL       Speech WDL    Speech WDL WDL       Perceptual State WDL     Perceptual State WDL WDL       Thought Process WDL    Thought Process WDL WDL       Intellectual Performance WDL    Intellectual Performance WDL WDL       Coping/Stress    Major Change/Loss/Stressor birth    Patient Personal Strengths future/goal oriented;motivated;positive attitude;strong support system    Sources of Support other family members;spouse                   Abuse/Neglect       Row Name 08/23/24 135       Personal Safety    Physical Signs of Abuse Present no                   Legal    No documentation.                  Substance Abuse    No documentation.                  Patient Forms    No documentation.                     KIMBERLY Acuna

## 2024-08-23 NOTE — PLAN OF CARE
Goal Outcome Evaluation:  Plan of Care Reviewed With: patient, spouse        Progress: improving  Outcome Evaluation: vss. ambulating with assistance to and from NICU. pain controlled with eras meds. fundus and lochai wnl. incision ROSLYN. reyes in place with adequate output.

## 2024-08-24 ENCOUNTER — LACTATION ENCOUNTER (OUTPATIENT)
Dept: NURSERY | Facility: HOSPITAL | Age: 29
End: 2024-08-24

## 2024-08-24 VITALS
SYSTOLIC BLOOD PRESSURE: 115 MMHG | HEART RATE: 83 BPM | TEMPERATURE: 98 F | RESPIRATION RATE: 16 BRPM | OXYGEN SATURATION: 98 % | DIASTOLIC BLOOD PRESSURE: 80 MMHG

## 2024-08-24 PROCEDURE — 0503F POSTPARTUM CARE VISIT: CPT | Performed by: OBSTETRICS & GYNECOLOGY

## 2024-08-24 PROCEDURE — 25010000002 ENOXAPARIN PER 10 MG

## 2024-08-24 RX ORDER — IBUPROFEN 600 MG/1
600 TABLET, FILM COATED ORAL EVERY 6 HOURS
Qty: 30 TABLET | Refills: 1 | Status: SHIPPED | OUTPATIENT
Start: 2024-08-24

## 2024-08-24 RX ORDER — OXYCODONE HYDROCHLORIDE 5 MG/1
5 TABLET ORAL EVERY 4 HOURS PRN
Qty: 12 TABLET | Refills: 0 | Status: SHIPPED | OUTPATIENT
Start: 2024-08-24 | End: 2024-08-27

## 2024-08-24 RX ADMIN — IBUPROFEN 600 MG: 600 TABLET, FILM COATED ORAL at 13:41

## 2024-08-24 RX ADMIN — OXYCODONE HYDROCHLORIDE 5 MG: 5 TABLET ORAL at 13:41

## 2024-08-24 RX ADMIN — ACETAMINOPHEN 325MG 650 MG: 325 TABLET ORAL at 10:44

## 2024-08-24 RX ADMIN — OXYCODONE HYDROCHLORIDE 5 MG: 5 TABLET ORAL at 00:27

## 2024-08-24 RX ADMIN — IBUPROFEN 600 MG: 600 TABLET, FILM COATED ORAL at 06:16

## 2024-08-24 RX ADMIN — ENOXAPARIN SODIUM 40 MG: 100 INJECTION SUBCUTANEOUS at 10:45

## 2024-08-24 RX ADMIN — IBUPROFEN 600 MG: 600 TABLET, FILM COATED ORAL at 00:27

## 2024-08-24 RX ADMIN — ACETAMINOPHEN 325MG 650 MG: 325 TABLET ORAL at 03:22

## 2024-08-24 NOTE — DISCHARGE SUMMARY
Date of Discharge:  2024    Discharge Diagnosis: 36-week repeat     Presenting Problem/History of Present Illness  Active Hospital Problems   No active problems to display.      Resolved Hospital Problems    Diagnosis Date Resolved POA    **Pregnancy [Z34.90] 2024 Not Applicable          Hospital Course  Patient is a 28 y.o. female presented with plans for 36-week repeat  due to prior  classical  section.  Please see separate history and physical and operative summary for details.  Patient did very well in the postpartum unit.  Her hemoglobin went from 12.3-10.7 and she was asymptomatic.  She is doing well on oral pain medications.  She is ambulating, voiding, tolerating regular p.o. intake and stable for discharge home on postop day 2.  Patient desires discharge on postop day 2.  Her baby is in the NICU being managed for respiratory insufficiency but improving.  The patient would like to go home and come back to sit with her baby as an outpatient..      Procedures Performed    Procedure(s):   SECTION REPEAT  -------------------       Consults:   Consults       No orders found from 2024 to 2024.            Pertinent Test Results: labs: Postop hemoglobin 10.7    Condition on Discharge: Stable and improving    Vital Signs  Temp:  [97.9 °F (36.6 °C)-98.4 °F (36.9 °C)] 97.9 °F (36.6 °C)  Heart Rate:  [] 114  Resp:  [17-18] 18  BP: ()/(61-68) 96/62    Physical Exam:   General Appearance: alert, appears stated age, and cooperative  Abdomen: normal bowel sounds, no masses, no hepatomegaly, no splenomegaly, soft non-tender, no guarding, no rebound tenderness, and incision is dry and intact with no evidence of separation or infection.  Psych: normal    Discharge Disposition  Home or Self Care    Discharge Medications     Discharge Medications        New Medications        Instructions Start Date   ibuprofen 600 MG tablet  Commonly known as:  ADVIL,MOTRIN   600 mg, Oral, Every 6 Hours      oxyCODONE 5 MG immediate release tablet  Commonly known as: ROXICODONE   5 mg, Oral, Every 4 Hours PRN             Continue These Medications        Instructions Start Date   BD Pen Needle Yahaira 2nd Gen 32G X 4 MM misc  Generic drug: Insulin Pen Needle   1 Device, Does not apply, Daily      glucose monitor monitoring kit   1 each, Does not apply, As Needed, Take fasting and 2 hours after start of breakfast, lunch, dinner      Lancet Device misc   Take blood sugar fasting and 2 hours after breakfast, lunch, dinner      Prenatal Vitamin 27-0.8 MG tablet   1 tablet, Oral, Daily             Stop These Medications      glucose blood test strip     Insulin Glargine 100 UNIT/ML injection pen  Commonly known as: LANTUS SOLOSTAR     metFORMIN  MG 24 hr tablet  Commonly known as: GLUCOPHAGE-XR     Progesterone 200 MG capsule  Commonly known as: Prometrium            ASK your doctor about these medications        Instructions Start Date   docusate sodium 100 MG capsule  Commonly known as: Colace   100 mg, Oral, 2 Times Daily      promethazine 12.5 MG tablet  Commonly known as: PHENERGAN   12.5 mg, Oral, Every 6 Hours PRN               Discharge Diet:     Activity at Discharge:     Follow-up Appointments  Future Appointments   Date Time Provider Department Center   9/4/2024  1:30 PM Serina Moreno MD [facility]         Test Results Pending at Discharge       Ken Jones MD  08/24/24  10:57 EDT    Time:

## 2024-08-24 NOTE — PLAN OF CARE
Goal Outcome Evaluation:              Outcome Evaluation: VSS.  Pt ambulating to NICU.  Voiding without difficulty.  Fundal assessment and lochia, wnl.

## 2024-08-24 NOTE — LACTATION NOTE
This note was copied from a baby's chart.  P2 36w2d baby, 41hrs old. Mom has been pumping with HGP but is not seeing any milk yet. Reviewed HGP operation, encouraged hydration and pumping every 3hrs. Mom reports she pumped x8 months with her first baby who was also .  Encouraged to call for any assistance or questions.

## 2024-08-24 NOTE — DISCHARGE INSTRUCTIONS
Routine  instructions.  Pelvic rest for 6 weeks.  No driving for 1 week.  Recommend appointment with Dr. Moreno in 2 weeks for incision check.

## 2024-08-26 ENCOUNTER — LACTATION ENCOUNTER (OUTPATIENT)
Dept: NURSERY | Facility: HOSPITAL | Age: 29
End: 2024-08-26

## 2024-08-26 NOTE — LACTATION NOTE
This note was copied from a baby's chart.  Lactation Consult Note  Infant's RN asked LC to help mom BF baby for a first time. Assisted mom with latching baby on the left breast in a cross cradle position. Educated PT on starting nipple to nose to achieve deep latch and baby was able to do it after few attempts and some suck training. She is latching well and has a good jaw rotation and audible swallows, but is falling asleep. Educated mom on different ways to keep baby awake during BF.  Encouraged her to BF baby every 2-3 hours for 10-15 min on each breast. Educated on the importance of deep latch,  how to know if infant is getting enough and burping baby between breasts. Mom's milk is in, she is pumping an average of 60 mls. Baby BF for 11 min. on the left breast and was burped and transferred to the right one, where she BF for 7 more. Encouraged to call if needing further help.       Evaluation Completed: 2024 14:35 EDT  Patient Name: Yady Parkinson  :  2026  MRN:  [id number]     REFERRAL  INFORMATION:                          Date of Referral: 24   Person Making Referral: nurse  Maternal Reason for Referral: breastfeeding currently  Infant Reason for Referral: NICU admission, no latch achieved, other (see comments) (BF for a first time)    DELIVERY HISTORY:  This patient has no babies on file.  This patient has no babies on file.  Skin to skin initiation date/time:      Skin to skin end date/time:      This patient has no babies on file.    MATERNAL ASSESSMENT:  Breast Size Issue: none (24)  Breast Shape: Bilateral:, round (24)  Breast Density: Bilateral:, full (24)  Areola: Bilateral:, elastic (24)  Nipples: Bilateral:, everted, graspable (24)                INFANT ASSESSMENT:  This patient has no babies on file.  This patient has no babies on file.  This patient has no babies on file.  This patient has no babies on file.  This patient  has no babies on file.  This patient has no babies on file.  This patient has no babies on file.  This patient has no babies on file.  This patient has no babies on file.  This patient has no babies on file.  This patient has no babies on file.  This patient has no babies on file.  This patient has no babies on file.  This patient has no babies on file.  This patient has no babies on file.  This patient has no babies on file.  This patient has no babies on file.  This patient has no babies on file.  This patient has no babies on file.  This patient has no babies on file.      This patient has no babies on file.  This patient has no babies on file.  This patient has no babies on file.  This patient has no babies on file.  This patient has no babies on file.  This patient has no babies on file.    This patient has no babies on file.  This patient has no babies on file.  This patient has no babies on file.        MATERNAL INFANT FEEDING:     Maternal Emotional State: receptive, relaxed (08/26/24 1412)  Infant Positioning: cross-cradle (08/26/24 1412)   Signs of Milk Transfer: audible swallow (08/26/24 1412)  Pain with Feeding: no (08/26/24 1412)           Milk Ejection Reflex: present (08/26/24 1412)           Latch Assistance: minimal assistance (08/26/24 1412)                               EQUIPMENT TYPE:                                 BREAST PUMPING:          LACTATION REFERRALS:

## 2024-09-11 ENCOUNTER — OFFICE VISIT (OUTPATIENT)
Dept: OBSTETRICS AND GYNECOLOGY | Facility: CLINIC | Age: 29
End: 2024-09-11
Payer: COMMERCIAL

## 2024-09-11 VITALS
HEART RATE: 78 BPM | SYSTOLIC BLOOD PRESSURE: 107 MMHG | HEIGHT: 66 IN | BODY MASS INDEX: 25.71 KG/M2 | WEIGHT: 160 LBS | DIASTOLIC BLOOD PRESSURE: 75 MMHG

## 2024-09-11 NOTE — PROGRESS NOTES
Chief Complaint   Patient presents with    Postpartum Care        SUBJECTIVE:   Iain Parkinson is a 28 y.o.  who presents s/p  Repeat Low Transverse  Section on 24.  Reports she is doing well. Bleeding is scant  Bowel and bladder function have returned to normal  Mood changes none  breast feeding    OB History    Para Term  AB Living   2 2   2   2   SAB IAB Ectopic Molar Multiple Live Births           0 2      # Outcome Date GA Lbr Guru/2nd Weight Sex Type Anes PTL Lv   2  24 36w2d  2600 g (5 lb 11.7 oz) F CS-LTranv Spinal N BERNIE      Birth Comments: panda OR 3   1  17 26w2d  754 g (1 lb 10.6 oz) F CS-Classical Gen Y BERNIE        Past Medical History:   Diagnosis Date    Endometriosis     Gestational diabetes     Seasonal allergies     Uterine polyp      Past Surgical History:   Procedure Laterality Date    CERVICAL CERCLAGE N/A 2024    Procedure: CERVICAL CERCLAGE;  Surgeon: Rosaline Roach MD;  Location: [facility];  Service: Obstetrics;  Laterality: N/A;     SECTION       SECTION N/A 2024    Procedure:  SECTION REPEAT;  Surgeon: Serina Moreno MD;  Location: [facility] LABOR DELIVERY;  Service: Obstetrics/Gynecology;  Laterality: N/A;    D & C HYSTEROSCOPY N/A 2022    Procedure: DILATATION AND CURETTAGE HYSTEROSCOPY WITH MYOSURE,POLYPECTOMY;  Surgeon: Serina Moreno MD;  Location: [facility];  Service: Obstetrics/Gynecology;  Laterality: N/A;    MINI-LAPAROTOMY  2020    subcutaneous endometrioma     Social History     Tobacco Use    Smoking status: Never    Smokeless tobacco: Never   Vaping Use    Vaping status: Never Used   Substance Use Topics    Alcohol use: Never    Drug use: Never     Family History   Problem Relation Age of Onset    Thyroid disease Mother     Diabetes Father     Thyroid disease Sister     Malig Hyperthermia Neg Hx        Patient Active Problem List   Diagnosis    Abnormal uterine  "bleeding (AUB)    Endometrial polyp    History of classical  section    Short cervix affecting pregnancy        OBJECTIVE:   /75   Pulse 78   Ht 167.6 cm (65.98\")   Wt 72.6 kg (160 lb)   LMP 10/26/2023   BMI 25.84 kg/m²    Physical Examination:   General appearance - alert, well appearing, and in no distress  Breasts - deferred  Chest - no tachypnea, retractions or cyanosis  Heart - normal rate and regular rhythm  Abdomen - soft, nontender, nondistended, no masses or organomegaly; Incision healing well, no drainage, no erythema, no hernia, no seroma, no swelling, well approximated  Pelvic - deferred  Neurological - screening mental status exam normal  Musculoskeletal - no joint tenderness, deformity or swelling  Psychiatric - normal mood and affect    Lab Results   Component Value Date    WBC 9.17 2024    HGB 10.7 (L) 2024    HCT 32.0 (L) 2024    MCV 92.2 2024     2024        ASSESSMENT/PLAN:  (Z39.2) Postpartum care and examination    Doing well postpartum  Baby doing well  At this time, continue pelvic rest and lifting precautions.  Reviewed last pap smear Dec 2022    Return in about 4 weeks (around 10/9/2024) for Recheck.          "

## 2025-06-16 ENCOUNTER — OFFICE VISIT (OUTPATIENT)
Dept: FAMILY MEDICINE CLINIC | Facility: CLINIC | Age: 30
End: 2025-06-16
Payer: COMMERCIAL

## 2025-06-16 VITALS
SYSTOLIC BLOOD PRESSURE: 116 MMHG | TEMPERATURE: 97.4 F | HEIGHT: 66 IN | OXYGEN SATURATION: 98 % | BODY MASS INDEX: 24.59 KG/M2 | RESPIRATION RATE: 16 BRPM | DIASTOLIC BLOOD PRESSURE: 82 MMHG | HEART RATE: 85 BPM | WEIGHT: 153 LBS

## 2025-06-16 DIAGNOSIS — R73.02 IGT (IMPAIRED GLUCOSE TOLERANCE): ICD-10-CM

## 2025-06-16 DIAGNOSIS — N92.6 MISSED PERIOD: ICD-10-CM

## 2025-06-16 DIAGNOSIS — D50.8 IRON DEFICIENCY ANEMIA SECONDARY TO INADEQUATE DIETARY IRON INTAKE: ICD-10-CM

## 2025-06-16 DIAGNOSIS — N92.6 IRREGULAR MENSTRUAL CYCLE: ICD-10-CM

## 2025-06-16 DIAGNOSIS — E55.9 VITAMIN D DEFICIENCY: ICD-10-CM

## 2025-06-16 DIAGNOSIS — R53.82 CHRONIC FATIGUE: Primary | ICD-10-CM

## 2025-06-16 DIAGNOSIS — Z83.49 FAMILY HISTORY OF THYROID DISORDER: ICD-10-CM

## 2025-06-16 PROBLEM — N93.9 ABNORMAL UTERINE BLEEDING (AUB): Status: RESOLVED | Noted: 2022-10-04 | Resolved: 2025-06-16

## 2025-06-16 PROBLEM — O26.879 SHORT CERVIX AFFECTING PREGNANCY: Status: RESOLVED | Noted: 2024-04-17 | Resolved: 2025-06-16

## 2025-06-16 LAB
B-HCG UR QL: NEGATIVE
EXPIRATION DATE: NORMAL
EXPIRATION DATE: NORMAL
HBA1C MFR BLD: 5.2 % (ref 4.5–5.7)
INTERNAL NEGATIVE CONTROL: NEGATIVE
INTERNAL POSITIVE CONTROL: POSITIVE
Lab: NORMAL
Lab: NORMAL

## 2025-06-16 RX ORDER — PNV NO.95/FERROUS FUM/FOLIC AC 28MG-0.8MG
1 TABLET ORAL DAILY
Qty: 90 TABLET | Refills: 3 | Status: SHIPPED | OUTPATIENT
Start: 2025-06-16

## 2025-06-16 NOTE — PROGRESS NOTES
"Subjective     Iain Parkinson is a 29 y.o. female.     Chief Complaint   Patient presents with    Establish Care     Pt is fasting would like to check vitamin levels and A1c.    Amenorrhea     Since last year. Had baby August 22nd still no period.       History of Present Illness     To establish care, discuss the followings ;    She has 2 kids.  Youngest 9 months old.   Not breast feeding   H/o gestation DM ,was on Insulin  Today A1c 5.2  Eats RD    Dose not have period since birth.   H/o irregular cycle  She stopped breast feeding 3 months ago.     Feeling fatigue and tired for > 6 months, has on/off palpitation, has poor appetite , poor energy.Not depressed.   +ve Fhx for thyroid disease   Has anemia; not on supple    Vit D defi; not on suppl        Labs and documentation from consulting physician has been reviewed        Lab Results   Component Value Date    HGBA1C 5.2 06/16/2025    HGBA1C 5.3 03/08/2024    HGBA1C 5.40 09/13/2022      Lab Results   Component Value Date    WBC 9.17 08/23/2024    HGB 10.7 (L) 08/23/2024    HCT 32.0 (L) 08/23/2024    MCV 92.2 08/23/2024     08/23/2024     No results found for: \"CHOL\", \"CHLPL\", \"TRIG\", \"HDL\", \"LDL\", \"LDLDIRECT\"    Lab Results   Component Value Date    GLUCOSE 76 08/22/2024    BUN 5 (L) 08/22/2024    CREATININE 0.46 (L) 08/22/2024     08/22/2024    K 3.9 08/22/2024     08/22/2024    CALCIUM 9.0 08/22/2024    PROTEINTOT 6.9 08/22/2024    ALBUMIN 3.6 08/22/2024    ALT 15 08/22/2024    AST 15 08/22/2024    ALKPHOS 116 08/22/2024    BILITOT 0.3 08/22/2024    GLOB 3.3 08/22/2024    AGRATIO 1.1 08/22/2024    BCR 10.9 08/22/2024    ANIONGAP 12.9 08/22/2024    EGFR 132.2 08/22/2024     Lab Results   Component Value Date    TSH 2.630 09/13/2022         The following portions of the patient's history were reviewed and updated as appropriate: allergies, current medications, past family history, past medical history, past social history, past surgical " history, and problem list.        Review of Systems   Constitutional:  Positive for fatigue.   Genitourinary:  Positive for menstrual problem.       Vitals:    06/16/25 1105   BP: 116/82   Pulse: 85   Resp: 16   Temp: 97.4 °F (36.3 °C)   SpO2: 98%           06/16/25  1105   Weight: 69.4 kg (153 lb)         Body mass index is 24.71 kg/m².      Current Outpatient Medications   Medication Sig Dispense Refill    Prenatal Vit-Fe Fumarate-FA (Prenatal Vitamin) 27-0.8 MG tablet Take 1 tablet by mouth Daily. 90 tablet 3     No current facility-administered medications for this visit.                Objective   Physical Exam  Vitals and nursing note reviewed.   Constitutional:       General: She is not in acute distress.     Appearance: She is not toxic-appearing.   Neck:      Comments: No enlarged thyroid      Cardiovascular:      Rate and Rhythm: Normal rate and regular rhythm.      Heart sounds: Normal heart sounds. No murmur heard.  Pulmonary:      Effort: Pulmonary effort is normal. No respiratory distress.      Breath sounds: Normal breath sounds. No stridor. No wheezing or rhonchi.   Abdominal:      General: Bowel sounds are normal. There is no distension.      Palpations: Abdomen is soft. There is no mass.      Tenderness: There is no abdominal tenderness. There is no guarding or rebound.      Hernia: No hernia is present.   Musculoskeletal:      Cervical back: Neck supple.   Neurological:      Mental Status: She is alert and oriented to person, place, and time.   Psychiatric:         Mood and Affect: Mood normal.         Behavior: Behavior normal.         Thought Content: Thought content normal.           Assessment & Plan   Diagnoses and all orders for this visit:    1. Chronic fatigue (Primary)  -     Iron Profile w/o Ferritin  -     CBC (No Diff)  -     Lipid Panel  -     Comprehensive Metabolic Panel  -     TSH Rfx On Abnormal To Free T4  -     Vitamin B12  -     Prenatal Vit-Fe Fumarate-FA (Prenatal Vitamin)  27-0.8 MG tablet; Take 1 tablet by mouth Daily.  Dispense: 90 tablet; Refill: 3    2. Iron deficiency anemia secondary to inadequate dietary iron intake  -     Iron Profile w/o Ferritin  -     CBC (No Diff)  -     Prenatal Vit-Fe Fumarate-FA (Prenatal Vitamin) 27-0.8 MG tablet; Take 1 tablet by mouth Daily.  Dispense: 90 tablet; Refill: 3    3. Vitamin D deficiency  -     Vitamin D,25-Hydroxy    4. Family history of thyroid disorder  -     TSH Rfx On Abnormal To Free T4  -     Thyroid Peroxidase Antibody    5. IGT (impaired glucose tolerance)  -     POC Glycosylated Hemoglobin (Hb A1C)    6. Missed period  Comments:  neg PT  Orders:  -     US Pelvis Complete; Future  -     POC Pregnancy, Urine    7. Irregular menstrual cycle  -     US Pelvis Complete; Future            Patient was given instructions and counseling regarding her condition or for health maintenance advice. Please see specific information pulled into the AVS if appropriate.       I have fully discussed the nature of the medical condition(s) risks, complications, management, safe and proper use of medications.   Pt stated no allergy to the above prescribed medication.  I have discussed the SIDE EFFECT OF MEDICATION and importance TO report any side effect , the patient expressed good understanding.  Encouraged medication compliance and the importance of keeping scheduled follow up appointments with me and any other providers.    Patient instructed to follow up with our office for results on any labs/imaging ordered during this visit.    Home care discussed  All questions answered  Patient verbalizes understanding and agrees to treatment plan.     Follow up: Return in about 2 weeks (around 6/30/2025) for follow up on current illness.

## 2025-06-17 LAB
25(OH)D3+25(OH)D2 SERPL-MCNC: 13.4 NG/ML (ref 30–100)
ALBUMIN SERPL-MCNC: 4.6 G/DL (ref 3.9–4.9)
ALP SERPL-CCNC: 96 IU/L (ref 44–121)
ALT SERPL-CCNC: 14 IU/L (ref 0–32)
AST SERPL-CCNC: 13 IU/L (ref 0–40)
BILIRUB SERPL-MCNC: 0.2 MG/DL (ref 0–1.2)
BUN SERPL-MCNC: 15 MG/DL (ref 6–20)
BUN/CREAT SERPL: 26 (ref 9–23)
CALCIUM SERPL-MCNC: 9.2 MG/DL (ref 8.7–10.2)
CHLORIDE SERPL-SCNC: 102 MMOL/L (ref 96–106)
CHOLEST SERPL-MCNC: 252 MG/DL (ref 100–199)
CO2 SERPL-SCNC: 20 MMOL/L (ref 20–29)
CREAT SERPL-MCNC: 0.58 MG/DL (ref 0.57–1)
EGFRCR SERPLBLD CKD-EPI 2021: 124 ML/MIN/1.73
ERYTHROCYTE [DISTWIDTH] IN BLOOD BY AUTOMATED COUNT: 12 % (ref 11.7–15.4)
GLOBULIN SER CALC-MCNC: 3 G/DL (ref 1.5–4.5)
GLUCOSE SERPL-MCNC: 89 MG/DL (ref 70–99)
HCT VFR BLD AUTO: 43.6 % (ref 34–46.6)
HDLC SERPL-MCNC: 47 MG/DL
HGB BLD-MCNC: 14.6 G/DL (ref 11.1–15.9)
IRON SATN MFR SERPL: 14 % (ref 15–55)
IRON SERPL-MCNC: 52 UG/DL (ref 27–159)
LDLC SERPL CALC-MCNC: 186 MG/DL (ref 0–99)
MCH RBC QN AUTO: 30.7 PG (ref 26.6–33)
MCHC RBC AUTO-ENTMCNC: 33.5 G/DL (ref 31.5–35.7)
MCV RBC AUTO: 92 FL (ref 79–97)
PLATELET # BLD AUTO: 349 X10E3/UL (ref 150–450)
POTASSIUM SERPL-SCNC: 4.4 MMOL/L (ref 3.5–5.2)
PROT SERPL-MCNC: 7.6 G/DL (ref 6–8.5)
RBC # BLD AUTO: 4.76 X10E6/UL (ref 3.77–5.28)
SODIUM SERPL-SCNC: 137 MMOL/L (ref 134–144)
THYROPEROXIDASE AB SERPL-ACNC: 11 IU/ML (ref 0–34)
TIBC SERPL-MCNC: 365 UG/DL (ref 250–450)
TRIGL SERPL-MCNC: 106 MG/DL (ref 0–149)
TSH SERPL DL<=0.005 MIU/L-ACNC: 1.4 UIU/ML (ref 0.45–4.5)
UIBC SERPL-MCNC: 313 UG/DL (ref 131–425)
VIT B12 SERPL-MCNC: 425 PG/ML (ref 232–1245)
VLDLC SERPL CALC-MCNC: 19 MG/DL (ref 5–40)
WBC # BLD AUTO: 9.2 X10E3/UL (ref 3.4–10.8)

## 2025-06-27 ENCOUNTER — TELEPHONE (OUTPATIENT)
Dept: OBSTETRICS AND GYNECOLOGY | Facility: CLINIC | Age: 30
End: 2025-06-27

## 2025-06-27 NOTE — TELEPHONE ENCOUNTER
Caller: Iain Parkinson    Relationship:  Self    Best call back number: [phone]    PATIENT CALLED REQUESTING TO CANCEL SAME DAY APPT.    Did the patient call AFTER the start time of their scheduled appointment?  []YES  [x]NO    Was the patient's appointment rescheduled? [x]YES  []NO    Any additional information: SCHEDULE CONFLICT

## 2025-08-08 ENCOUNTER — TELEPHONE (OUTPATIENT)
Dept: OBSTETRICS AND GYNECOLOGY | Facility: CLINIC | Age: 30
End: 2025-08-08
Payer: COMMERCIAL

## (undated) DEVICE — IRRIGATOR BULB ASEPTO 60CC STRL

## (undated) DEVICE — SUT MNCRYL 0/0 CTX 36IN Y398H

## (undated) DEVICE — DEV TISS REMOV MYOSURE REACH

## (undated) DEVICE — GLV SURG BIOGEL LTX PF 6 1/2

## (undated) DEVICE — ANTIBACTERIAL UNDYED BRAIDED (POLYGLACTIN 910), SYNTHETIC ABSORBABLE SUTURE: Brand: COATED VICRYL

## (undated) DEVICE — SUT MERSLN 5MM MO4 12IN WHT RS23

## (undated) DEVICE — GLV SURG BIOGEL LTX PF 6

## (undated) DEVICE — SKIN PREP TRAY W/CHG: Brand: MEDLINE INDUSTRIES, INC.

## (undated) DEVICE — SUT MNCRYL 3/0 KS 27IN UD MCP523H

## (undated) DEVICE — APPL HEMO SURG ARISTA/AH/FLEXITIP 14CM

## (undated) DEVICE — LOU D & C HYSTEROSCOPY: Brand: MEDLINE INDUSTRIES, INC.

## (undated) DEVICE — ADHS SKIN PREMIERPRO EXOFIN TOPICAL HI/VISC .5ML

## (undated) DEVICE — SEAL HYSTERSCOPE/OUTFLOW CHANNEL MYOSURE

## (undated) DEVICE — SOL IRR NACL 0.9PCT BT 1000ML

## (undated) DEVICE — STRAP STIRUP WO/ RNG

## (undated) DEVICE — SLV SCD CALF HEMOFORCE DVT THERP REPROC MD

## (undated) DEVICE — SOL IRR H2O BTL 1000ML STRL

## (undated) DEVICE — PREP SOL POVIDONE/IODINE BT 4OZ

## (undated) DEVICE — MEDI-VAC YANKAUER SUCTION HANDLE W/BULBOUS TIP: Brand: CARDINAL HEALTH

## (undated) DEVICE — SOL IRR NACL 0.9PCT 3000ML